# Patient Record
Sex: FEMALE | Race: WHITE | NOT HISPANIC OR LATINO | Employment: OTHER | URBAN - METROPOLITAN AREA
[De-identification: names, ages, dates, MRNs, and addresses within clinical notes are randomized per-mention and may not be internally consistent; named-entity substitution may affect disease eponyms.]

---

## 2018-09-08 ENCOUNTER — OUTSIDE FACILITY SERVICE (OUTPATIENT)
Dept: CARDIAC SURGERY | Facility: CLINIC | Age: 68
End: 2018-09-08

## 2018-09-08 PROCEDURE — 33533 CABG ARTERIAL SINGLE: CPT | Performed by: THORACIC SURGERY (CARDIOTHORACIC VASCULAR SURGERY)

## 2018-09-08 PROCEDURE — 33508 ENDOSCOPIC VEIN HARVEST: CPT | Performed by: THORACIC SURGERY (CARDIOTHORACIC VASCULAR SURGERY)

## 2018-09-08 PROCEDURE — 33519 CABG ARTERY-VEIN THREE: CPT | Performed by: THORACIC SURGERY (CARDIOTHORACIC VASCULAR SURGERY)

## 2018-09-28 ENCOUNTER — CLINICAL SUPPORT (OUTPATIENT)
Dept: CARDIAC SURGERY | Facility: CLINIC | Age: 68
End: 2018-09-28

## 2018-09-28 DIAGNOSIS — I25.110 ATHEROSCLEROSIS OF NATIVE CORONARY ARTERY WITH UNSTABLE ANGINA PECTORIS, UNSPECIFIED WHETHER NATIVE OR TRANSPLANTED HEART (HCC): ICD-10-CM

## 2018-09-28 DIAGNOSIS — E11.22 TYPE 2 DIABETES MELLITUS WITH DIABETIC CHRONIC KIDNEY DISEASE, UNSPECIFIED CKD STAGE, UNSPECIFIED WHETHER LONG TERM INSULIN USE (HCC): ICD-10-CM

## 2018-09-28 DIAGNOSIS — I12.9 HYPERTENSIVE CHRONIC KIDNEY DISEASE WITH STAGE 1 THROUGH STAGE 4 CHRONIC KIDNEY DISEASE, OR UNSPECIFIED CHRONIC KIDNEY DISEASE: ICD-10-CM

## 2018-09-28 DIAGNOSIS — Z48.812 ENCOUNTER FOR SURGICAL AFTERCARE FOLLOWING SURGERY OF CIRCULATORY SYSTEM: Primary | ICD-10-CM

## 2018-09-28 PROCEDURE — G0180 MD CERTIFICATION HHA PATIENT: HCPCS | Performed by: THORACIC SURGERY (CARDIOTHORACIC VASCULAR SURGERY)

## 2018-10-19 RX ORDER — GUAIFENESIN 600 MG/1
1200 TABLET, EXTENDED RELEASE ORAL 2 TIMES DAILY
COMMUNITY
End: 2018-10-22 | Stop reason: ALTCHOICE

## 2018-10-19 RX ORDER — NATEGLINIDE 120 MG/1
120 TABLET ORAL 2 TIMES DAILY
COMMUNITY

## 2018-10-22 ENCOUNTER — OFFICE VISIT (OUTPATIENT)
Dept: CARDIAC SURGERY | Facility: CLINIC | Age: 68
End: 2018-10-22

## 2018-10-22 VITALS
BODY MASS INDEX: 29.7 KG/M2 | HEART RATE: 90 BPM | OXYGEN SATURATION: 96 % | WEIGHT: 189.2 LBS | SYSTOLIC BLOOD PRESSURE: 134 MMHG | RESPIRATION RATE: 20 BRPM | HEIGHT: 67 IN | TEMPERATURE: 98 F | DIASTOLIC BLOOD PRESSURE: 82 MMHG

## 2018-10-22 DIAGNOSIS — Z95.1 S/P CABG X 4: Primary | ICD-10-CM

## 2018-10-22 PROCEDURE — 99024 POSTOP FOLLOW-UP VISIT: CPT | Performed by: NURSE PRACTITIONER

## 2018-10-22 RX ORDER — LISINOPRIL 2.5 MG/1
2.5 TABLET ORAL DAILY
COMMUNITY
End: 2020-02-09

## 2018-10-22 NOTE — PROGRESS NOTES
"CARDIOVASCULAR SURGERY FOLLOW-UP PROGRESS NOTE  Chief Complaint: Post-op Follow Up        HPI:   Dear Dr. Ritchie, Marky MEJÍA MD and colleagues:    It was nice to see Kathy Wu in follow up today after cardiac surgery.  As you know, she is a 68 y.o. female who presented to Dr. Ritchie's office and was direct admitted to [facility].  She was diagnosed with severe CAD, tobacco abuse, and DM who underwent CABG at [facility] by Dr. Palmer on 9/8/2018. She did well postoperatively but did require nocturnal oxygen at discharge.  She continues to do well. She comes in today complaining of nothing.  Her activity level has been good.   She has stopped smoking.  She has followed up with Dr. Hurtado and Dr. Ritchie. Her  is with her today.    Physical Exam:         /82 (BP Location: Right arm, Patient Position: Sitting, Cuff Size: Adult)   Pulse 90   Temp 98 °F (36.7 °C) (Oral)   Resp 20   Ht 168.9 cm (66.5\")   Wt 85.8 kg (189 lb 3.2 oz)   SpO2 96%   BMI 30.08 kg/m²   Heart:  regular rate and rhythm  Lungs:  clear to auscultation bilaterally  Extremities:  no edema  Incision(s):  mid chest healing well, right leg healing well    Assessment/Plan:     S/P CABG. Overall, she is doing well.  She will follow up with Dr. Ritchie about her nocturnal oxygen and the study to see if she still needs the oxygen.  She states she is not doing cardiac rehab because she is already so active.      No significant post-op complications.    Follow-up with CT surgery prn.    No restrictions of activity.      Thank you for allowing me to participate in the care of your patient.    Regards,  Maria Eugenia Rangel, APRN      "

## 2019-06-05 ENCOUNTER — CONVERSION ENCOUNTER (OUTPATIENT)
Dept: CARDIOLOGY | Facility: CLINIC | Age: 69
End: 2019-06-05

## 2019-06-05 VITALS
DIASTOLIC BLOOD PRESSURE: 79 MMHG | BODY MASS INDEX: 29.17 KG/M2 | SYSTOLIC BLOOD PRESSURE: 105 MMHG | HEART RATE: 97 BPM | WEIGHT: 183.5 LBS

## 2019-06-06 NOTE — PROGRESS NOTES
Visit Type:  Follow-up Visit  Primary Provider:  Marky Ritchie MD    CC:  6 mo f/u hypertension.    History of Present Illness:  69-year-old white female with history of coronary disease status post coronary artery bypass surgery hypertension hyperlipidemia diabetes and COPD presents to office for follow-up.  Patient is currently stable without any symptoms of chest pain or   shortness of breath at rest or exertion.  No complaints of any PND or orthopnea.  No palpitations dizziness syncope or swelling of the feet.  She was in the hospital complaints of chest pain and had a stress Myoview which was abnormal.  She had cardiac   catheterization which showed severe 3 vessel coronary disease.  She underwent coronary bypass surgery with a LIMA to LAD and saphenous graft to the 1st diagonal branch and to the lateral marginal branch of the circumflex artery into the PDA.  Patient is   taking medicines regularly and exercising regularly.  She does not smoke.      Vital Signs:    Patient Profile:    69 Years Old Female  Weight:     183.5 pounds  (Measured Weight:  183.5 lbs.  oz.)  Pulse rate: 97 / minute    BP sittin / 79  (left arm)  Cuff size:  regular   Vitals Entered By: Gertrude Johns (2019 10:00 AM)    Medications:  Medications were reviewed with the patient during this visit.    Allergies:   * CODEINE (Severe)    Allergies were reviewed with the patient during this visit.    Current Allergies (reviewed today):  * CODEINE (Severe)    Current Medications (including medications started today):   METFORMIN  MG ORAL TABLET (METFORMIN HCL) Take one (1) tablet by mouth twice a day  VITAMIN D2 TABLET (ERGOCALCIFEROL TABS) 50,000 IU  IPRATROPIUM-ALBUTEROL 0.5-2.5 (3) MG/3ML INHALATION SOLUTION (IPRATROPIUM-ALBUTEROL)   ASPIR-LOW 81 MG ORAL TABLET DELAYED RELEASE (ASPIRIN) Take 1 tablet by mouth daily  NATEGLINIDE 120 MG ORAL TABLET (NATEGLINIDE) Take 1 tablet by mouth daily  ALPRAZOLAM 1 MG ORAL TABLET  (ALPRAZOLAM) Take one (1) tablet by mouth four times a day  BUSPIRONE HCL 10 MG ORAL TABLET (BUSPIRONE HCL) Take one (1) tablet by mouth twice a day  METFORMIN  MG ORAL TABLET (METFORMIN HCL) Take one (1) tablet by mouth twice a day  LISINOPRIL 2.5 MG ORAL TABLET (LISINOPRIL) Take 1 tablet by mouth daily  ATORVASTATIN CALCIUM 40 MG ORAL TABLET (ATORVASTATIN CALCIUM) Take 1 tablet by mouth daily  FUROSEMIDE 20 MG ORAL TABLET (FUROSEMIDE) Take 1 tablet by mouth daily      Past Medical History:     Reviewed history from 10/01/2018 and no changes required:        Hypertension        Dyslipidemia        Type 2 diabetes         COPD    Past Surgical History:     Reviewed history from 10/01/2018 and no changes required:        Cholecystectomy        CABG x4 9/2018    Family History Summary:      Reviewed history Last on 02/07/2019 and no changes required:06/05/2019  Mother - Has Family History of Heart Disease - Entered On: 10/1/2018  Father - Has Family History of Heart Disease - Entered On: 10/1/2018      Social History:     Reviewed history and no changes required:        Risk Factors:     Smoked Tobacco Use:  Former smoker     Cigarettes:  Yes        Year quit:  2018        Years Since Last Quit:  1   Caffeine use:  0 drinks per day        Review of Systems     General       Denies fever, chills and fatigue.    Eyes       Denies double vision and blurring.    ENT       Denies earache and nosebleeds.    CV       Denies chest pain or discomfort, racing/skipping heart beats, lightheadedness, shortness of breath with exertion and swelling of hands or feet.    Resp       Denies cough.    GI       Denies nausea, vomiting, abdominal pain, diarrhea and constipation.    Derm       Denies rash.    Neuro       Denies headaches, numbness, tingling and fainting.    Psych       Denies anxiety and depression.    Heme       Denies bleeding and abnormal bruising.      Physical Exam    General:      well developed, well  nourished, in no acute distress.    Head:      normocephalic and atraumatic.    Eyes:      PERRL/EOM intact, conjunctiva and sclera clear with out nystagmus.    Neck:      no bruit and no JVD.    Lungs:      clear bilaterally to auscultation.    Heart:      non-displaced PMI, chest non-tender; regular rate and rhythm, S1, S2 without murmurs, rubs, or gallops  Abdomen:      non-tender.    Msk:      no deformity or scoliosis noted of thoracic or lumbar spine.    Pulses:      pulses normal in all 4 extremities.    Extremities:      no pedal edema.    Neurologic:        No focal deficits  Skin:      intact without lesions or rashes.    Psych:      alert and cooperative; normal mood and affect; normal attention span and concentration.      Diabetes Management Exam:      Foot Exam (with socks and/or shoes not present):        Pulses:           pulses normal in all 4 extremities.        Blood Pressure:  Today's BP: 105/79 mm Hg            Impression & Recommendations:    Problem # 1:  CORONARY ARTERY DISEASE (ICD-414.00) (MVN89-D95.10)    Patient had Coronary bypass surgery with a LIMA to LAD and saphenous graft to the diagonal branch, to the marginal branch and to the PDA of the right coronary.  Patient is currently stable on medications.  The following medications were removed from the medication list:     Metoprolol Tartrate 25 Mg Oral Tablet (Metoprolol tartrate) ..... Take 1 tablet by mouth every 12 hours    Her updated medication list for this problem includes:     Aspir-low 81 Mg Oral Tablet Delayed Release (Aspirin) ..... Take 1 tablet by mouth daily     Lisinopril 2.5 Mg Oral Tablet (Lisinopril) ..... Take 1 tablet by mouth daily     Furosemide 20 Mg Oral Tablet (Furosemide) ..... Take 1 tablet by mouth daily      Problem # 2:  Diabetes mellitus, type 2 (ICD-250.00) (AZB59-L35.9)    Patient's sugar levels are followed by primary care doc  Her updated medication list for this problem includes:     Metformin Hcl 500  Mg Oral Tablet (Metformin hcl) ..... Take one (1) tablet by mouth twice a day     Aspir-low 81 Mg Oral Tablet Delayed Release (Aspirin) ..... Take 1 tablet by mouth daily     Nateglinide 120 Mg Oral Tablet (Nateglinide) ..... Take 1 tablet by mouth daily     Metformin Hcl 500 Mg Oral Tablet (Metformin hcl) ..... Take one (1) tablet by mouth twice a day     Lisinopril 2.5 Mg Oral Tablet (Lisinopril) ..... Take 1 tablet by mouth daily      Problem # 3:  Dyslipidemia (ICD-272.4) (MQM64-E18.5)   patient's lipid levels are followed by the primary doctor  Her updated medication list for this problem includes:     Atorvastatin Calcium 40 Mg Oral Tablet (Atorvastatin calcium) ..... Take 1 tablet by mouth daily      Problem # 4:  Hypertension (ICD-401.9) (ZZD16-W70)   patient blood pressure is currently stable on medications  The following medications were removed from the medication list:     Metoprolol Tartrate 25 Mg Oral Tablet (Metoprolol tartrate) ..... Take 1 tablet by mouth every 12 hours    Her updated medication list for this problem includes:     Lisinopril 2.5 Mg Oral Tablet (Lisinopril) ..... Take 1 tablet by mouth daily     Furosemide 20 Mg Oral Tablet (Furosemide) ..... Take 1 tablet by mouth daily      Problem # 5:  COPD (IAL89-D89.9)   patient has COPD and does not smoke anymore.  Her updated medication list for this problem includes:     Ipratropium-albuterol 0.5-2.5 (3) Mg/3ml Inhalation Solution (Ipratropium-albuterol)      Medications Added to Medication List This Visit:  1)  Metformin Hcl 500 Mg Oral Tablet (Metformin hcl) .... Take one (1) tablet by mouth twice a day  2)  Vitamin D2 Tablet (Ergocalciferol tabs) .... 50,000 iu  3)  Lisinopril 2.5 Mg Oral Tablet (Lisinopril) .... Take 1 tablet by mouth daily                  Medication Administration    Orders Added:  1)  61839-Kwn Vst-Est Level IV [CPT-02236]    ]      Electronically signed by Clifford Huertas MD on 06/05/2019 at 10:51  AM  ________________________________________________________________________       Disclaimer: Converted Note message may not contain all data elements that existed in the legacy source system. Please see [facility] for the original note details.

## 2019-06-14 ENCOUNTER — OFFICE (OUTPATIENT)
Dept: URBAN - METROPOLITAN AREA CLINIC 64 | Facility: CLINIC | Age: 69
End: 2019-06-14

## 2019-06-14 VITALS
HEIGHT: 66 IN | DIASTOLIC BLOOD PRESSURE: 84 MMHG | SYSTOLIC BLOOD PRESSURE: 179 MMHG | WEIGHT: 183 LBS | HEART RATE: 80 BPM

## 2019-06-14 DIAGNOSIS — Z12.11 ENCOUNTER FOR SCREENING FOR MALIGNANT NEOPLASM OF COLON: ICD-10-CM

## 2019-06-14 DIAGNOSIS — K59.00 CONSTIPATION, UNSPECIFIED: ICD-10-CM

## 2019-06-14 PROCEDURE — 99202 OFFICE O/P NEW SF 15 MIN: CPT | Performed by: NURSE PRACTITIONER

## 2019-07-02 RX ORDER — MAGNESIUM CARB/ALUMINUM HYDROX 105-160MG
296 TABLET,CHEWABLE ORAL ONCE
Status: DISCONTINUED | OUTPATIENT
Start: 2019-07-02 | End: 2019-07-08 | Stop reason: HOSPADM

## 2019-07-08 ENCOUNTER — ANESTHESIA (OUTPATIENT)
Dept: GASTROENTEROLOGY | Facility: HOSPITAL | Age: 69
End: 2019-07-08

## 2019-07-08 ENCOUNTER — ANESTHESIA EVENT (OUTPATIENT)
Dept: GASTROENTEROLOGY | Facility: HOSPITAL | Age: 69
End: 2019-07-08

## 2019-07-08 ENCOUNTER — HOSPITAL ENCOUNTER (OUTPATIENT)
Facility: HOSPITAL | Age: 69
Setting detail: HOSPITAL OUTPATIENT SURGERY
Discharge: HOME | End: 2019-07-08
Attending: INTERNAL MEDICINE | Admitting: INTERNAL MEDICINE

## 2019-07-08 ENCOUNTER — ON CAMPUS - OUTPATIENT (OUTPATIENT)
Dept: URBAN - METROPOLITAN AREA HOSPITAL 85 | Facility: HOSPITAL | Age: 69
End: 2019-07-08
Payer: COMMERCIAL

## 2019-07-08 VITALS
HEIGHT: 66 IN | OXYGEN SATURATION: 99 % | RESPIRATION RATE: 14 BRPM | SYSTOLIC BLOOD PRESSURE: 148 MMHG | BODY MASS INDEX: 29.27 KG/M2 | TEMPERATURE: 97.2 F | HEART RATE: 79 BPM | WEIGHT: 182.1 LBS | DIASTOLIC BLOOD PRESSURE: 63 MMHG

## 2019-07-08 DIAGNOSIS — D12.0 BENIGN NEOPLASM OF CECUM: ICD-10-CM

## 2019-07-08 DIAGNOSIS — Z12.11 ENCOUNTER FOR SCREENING FOR MALIGNANT NEOPLASM OF COLON: ICD-10-CM

## 2019-07-08 DIAGNOSIS — K59.00 CONSTIPATION, UNSPECIFIED: ICD-10-CM

## 2019-07-08 DIAGNOSIS — R19.5 OTHER FECAL ABNORMALITIES: ICD-10-CM

## 2019-07-08 DIAGNOSIS — K64.4 RESIDUAL HEMORRHOIDAL SKIN TAGS: ICD-10-CM

## 2019-07-08 DIAGNOSIS — K64.8 OTHER HEMORRHOIDS: ICD-10-CM

## 2019-07-08 DIAGNOSIS — D12.2 BENIGN NEOPLASM OF ASCENDING COLON: ICD-10-CM

## 2019-07-08 DIAGNOSIS — K63.5 POLYP OF CECUM: ICD-10-CM

## 2019-07-08 DIAGNOSIS — K63.5 POLYP OF COLON: ICD-10-CM

## 2019-07-08 DIAGNOSIS — K57.30 DIVERTICULOSIS OF LARGE INTESTINE WITHOUT PERFORATION OR ABS: ICD-10-CM

## 2019-07-08 LAB
GLUCOSE BLDC GLUCOMTR-MCNC: 159 MG/DL (ref 70–105)
GLUCOSE BLDC GLUCOMTR-MCNC: 202 MG/DL (ref 70–105)

## 2019-07-08 PROCEDURE — 45385 COLONOSCOPY W/LESION REMOVAL: CPT | Performed by: INTERNAL MEDICINE

## 2019-07-08 PROCEDURE — 82962 GLUCOSE BLOOD TEST: CPT

## 2019-07-08 PROCEDURE — 88305 TISSUE EXAM BY PATHOLOGIST: CPT | Performed by: INTERNAL MEDICINE

## 2019-07-08 PROCEDURE — 25010000002 ONDANSETRON PER 1 MG: Performed by: ANESTHESIOLOGY

## 2019-07-08 PROCEDURE — 25010000002 PROPOFOL 10 MG/ML EMULSION: Performed by: ANESTHESIOLOGY

## 2019-07-08 RX ORDER — ONDANSETRON 2 MG/ML
4 INJECTION INTRAMUSCULAR; INTRAVENOUS ONCE AS NEEDED
Status: DISCONTINUED | OUTPATIENT
Start: 2019-07-08 | End: 2019-07-08 | Stop reason: HOSPADM

## 2019-07-08 RX ORDER — SODIUM CHLORIDE 9 MG/ML
9 INJECTION, SOLUTION INTRAVENOUS CONTINUOUS PRN
Status: DISCONTINUED | OUTPATIENT
Start: 2019-07-08 | End: 2019-07-08 | Stop reason: HOSPADM

## 2019-07-08 RX ORDER — PROMETHAZINE HYDROCHLORIDE 25 MG/1
25 TABLET ORAL ONCE AS NEEDED
Status: DISCONTINUED | OUTPATIENT
Start: 2019-07-08 | End: 2019-07-08 | Stop reason: HOSPADM

## 2019-07-08 RX ORDER — DEXAMETHASONE SODIUM PHOSPHATE 4 MG/ML
8 INJECTION, SOLUTION INTRA-ARTICULAR; INTRALESIONAL; INTRAMUSCULAR; INTRAVENOUS; SOFT TISSUE ONCE AS NEEDED
Status: DISCONTINUED | OUTPATIENT
Start: 2019-07-08 | End: 2019-07-08 | Stop reason: HOSPADM

## 2019-07-08 RX ORDER — PROMETHAZINE HYDROCHLORIDE 25 MG/ML
6.25 INJECTION, SOLUTION INTRAMUSCULAR; INTRAVENOUS ONCE AS NEEDED
Status: DISCONTINUED | OUTPATIENT
Start: 2019-07-08 | End: 2019-07-08 | Stop reason: HOSPADM

## 2019-07-08 RX ORDER — PROPOFOL 10 MG/ML
VIAL (ML) INTRAVENOUS AS NEEDED
Status: DISCONTINUED | OUTPATIENT
Start: 2019-07-08 | End: 2019-07-08 | Stop reason: SURG

## 2019-07-08 RX ORDER — PROMETHAZINE HYDROCHLORIDE 25 MG/1
25 SUPPOSITORY RECTAL ONCE AS NEEDED
Status: DISCONTINUED | OUTPATIENT
Start: 2019-07-08 | End: 2019-07-08 | Stop reason: HOSPADM

## 2019-07-08 RX ORDER — SODIUM CHLORIDE 0.9 % (FLUSH) 0.9 %
3-10 SYRINGE (ML) INJECTION AS NEEDED
Status: DISCONTINUED | OUTPATIENT
Start: 2019-07-08 | End: 2019-07-08 | Stop reason: HOSPADM

## 2019-07-08 RX ORDER — ONDANSETRON 2 MG/ML
4 INJECTION INTRAMUSCULAR; INTRAVENOUS ONCE AS NEEDED
Status: COMPLETED | OUTPATIENT
Start: 2019-07-08 | End: 2019-07-08

## 2019-07-08 RX ORDER — SODIUM CHLORIDE 0.9 % (FLUSH) 0.9 %
3 SYRINGE (ML) INJECTION EVERY 12 HOURS SCHEDULED
Status: DISCONTINUED | OUTPATIENT
Start: 2019-07-08 | End: 2019-07-08 | Stop reason: HOSPADM

## 2019-07-08 RX ORDER — MAGNESIUM CARB/ALUMINUM HYDROX 105-160MG
296 TABLET,CHEWABLE ORAL ONCE
Status: DISCONTINUED | OUTPATIENT
Start: 2019-07-08 | End: 2019-07-08 | Stop reason: HOSPADM

## 2019-07-08 RX ADMIN — ONDANSETRON 4 MG: 2 INJECTION INTRAMUSCULAR; INTRAVENOUS at 09:00

## 2019-07-08 RX ADMIN — PROPOFOL 300 MG: 10 INJECTION, EMULSION INTRAVENOUS at 09:06

## 2019-07-08 RX ADMIN — SODIUM CHLORIDE 9 ML/HR: 0.9 INJECTION, SOLUTION INTRAVENOUS at 08:49

## 2019-07-08 NOTE — DISCHARGE INSTRUCTIONS
A responsible adult should stay with you and you should rest quietly for the rest of the day.    Do not drink alcohol, drive, operate any heavy machinery or power tools or make any legal/important decisions for the next 24 hours.     Progress your diet as tolerated.    If you begin to experience severe pain, increased shortness of breath, racing heartbeat or a fever above 101 F, seek immediate medical attention.    Follow up with MD as instructed.

## 2019-07-08 NOTE — NURSING NOTE
Dr More informed that pt is on 325mg aspirin daily. He advised that pt can resume it today. Informed patient.    Pt blood sugar per meter 159. No new orders.

## 2019-07-08 NOTE — ANESTHESIA POSTPROCEDURE EVALUATION
Patient: Kathy Wu    Procedure Summary     Date:  07/08/19 Room / Location:  [facility] ENDOSCOPY 1 / [facility]COPY    Anesthesia Start:  0901 Anesthesia Stop:  0929    Procedure:  COLONOSCOPY with polypectomy X2 (N/A Rectum) Diagnosis:  (CONSTIPATION, SCREENING FOR MALIGNANT NEOPLASMS OF COLON)    Surgeon:  Jazz More MD Provider:  Steve Parra MD    Anesthesia Type:  MAC ASA Status:  3          Anesthesia Type: MAC  Last vitals  BP   148/63 (07/08/19 0950)   Temp   97.2 °F (36.2 °C) (07/08/19 0755)   Pulse   79 (07/08/19 0950)   Resp   14 (07/08/19 0952)     SpO2   99 % (07/08/19 0950)     Post Anesthesia Care and Evaluation    Patient location during evaluation: PACU  Patient participation: complete - patient participated  Level of consciousness: awake  Pain score: 0 (See nurse's notes for pain score)  Pain management: adequate  Airway patency: patent  Anesthetic complications: No anesthetic complications  PONV Status: none  Cardiovascular status: acceptable  Respiratory status: acceptable  Hydration status: acceptable    Comments: Patient seen and examined postoperatively; vital signs stable; SpO2 greater than or equal to 90%; cardiopulmonary status stable; nausea/vomiting adequately controlled; pain adequately controlled; no apparent anesthesia complications; patient discharged from anesthesia care when discharge criteria were met

## 2019-07-08 NOTE — OP NOTE
COLONOSCOPY Procedure Report    Patient Name:  Kathy Wu  YOB: 1950    Date of Surgery:  7/8/2019     Pre-Op Diagnosis:  CONSTIPATION, SCREENING FOR MALIGNANT NEOPLASMS OF COLON, patient had a positive Cologuard test as outpatient.       Post-Op Diagnosis Codes:  #1 polyp #2 diverticulosis #3 hemorrhoids      Procedure/CPT® Codes:      Procedure(s):  COLONOSCOPY with polypectomy X2    Staff:  Surgeon(s):  Jazz More MD      Anesthesia: Monitor Anesthesia Care    Description of Procedure:  Patient was brought to the endoscopy suite put in left lateral position after sedation with propofol under anesthesia,  scope regularization from rectum to cecum and terminal ileum.  Cecum ascending or proximal descending colon mucosa well-visualized.  Quality of prep was fair.  There was a one polyp removed from the cecum with a hot snare polypectomy technique.  This polyp measures on close to 8 mm.  There is another polyp removed from the ascending colon area close to an 8 mm with a cold snare polypectomy subsequently fulgurated with the tip of the hot snare.  Diverticulosis sigmoid descending colon was noticed.  She had extensive diverticulosis.  Moderate-sized internal/external hemorrhoid was seen.    Impression:  #1 polyps as detailed above  Diverticulosis  Hemorrhoids    Recommendations:  Follow with the biopsy result  Patient will be advised to take Benefiber twice a day.  Follow-up with the pathology report.  Repeat colonoscopy in 3 years.      Jazz More MD     Date: 7/8/2019    Time: 10:05 AM

## 2019-07-08 NOTE — INTERVAL H&P NOTE
H&P reviewed. The patient was examined and there are no changes to the H&P.   patient seen and examined.  Nurse traction findings verified.  Interval history was updated after reviewing the office note patient was seen on June 19, 2019 at the office.  She does have upper scope recently which was unremarkable now because of iron deficiency anemia, colonoscopy examination is being performed.  Clearance from the cardiology has been obtained.

## 2019-07-09 LAB
LAB AP CASE REPORT: NORMAL
PATH REPORT.FINAL DX SPEC: NORMAL
PATH REPORT.GROSS SPEC: NORMAL

## 2019-08-30 ENCOUNTER — TRANSCRIBE ORDERS (OUTPATIENT)
Dept: LAB | Facility: HOSPITAL | Age: 69
End: 2019-08-30

## 2019-08-30 ENCOUNTER — LAB (OUTPATIENT)
Dept: LAB | Facility: HOSPITAL | Age: 69
End: 2019-08-30

## 2019-08-30 DIAGNOSIS — E11.8 DIABETIC COMPLICATION (HCC): ICD-10-CM

## 2019-08-30 DIAGNOSIS — E55.9 VITAMIN D DEFICIENCY, UNSPECIFIED: ICD-10-CM

## 2019-08-30 DIAGNOSIS — N25.81 SECONDARY HYPERPARATHYROIDISM OF RENAL ORIGIN (HCC): ICD-10-CM

## 2019-08-30 DIAGNOSIS — I10 ESSENTIAL HYPERTENSION, MALIGNANT: ICD-10-CM

## 2019-08-30 DIAGNOSIS — N18.30 CHRONIC KIDNEY DISEASE, STAGE III (MODERATE) (HCC): Primary | ICD-10-CM

## 2019-08-30 DIAGNOSIS — R80.9 PROTEINURIA, UNSPECIFIED TYPE: ICD-10-CM

## 2019-08-30 DIAGNOSIS — E55.9 AVITAMINOSIS D: ICD-10-CM

## 2019-08-30 DIAGNOSIS — N18.30 CHRONIC KIDNEY DISEASE, STAGE III (MODERATE) (HCC): ICD-10-CM

## 2019-08-30 LAB
ANION GAP SERPL CALCULATED.3IONS-SCNC: 18.8 MMOL/L (ref 5–15)
BASOPHILS # BLD AUTO: 0 10*3/MM3 (ref 0–0.2)
BASOPHILS NFR BLD AUTO: 0.5 % (ref 0–1.5)
BUN BLD-MCNC: 26 MG/DL (ref 8–20)
BUN/CREAT SERPL: 20 (ref 5.4–26.2)
CALCIUM SPEC-SCNC: 9 MG/DL (ref 8.9–10.3)
CHLORIDE SERPL-SCNC: 101 MMOL/L (ref 101–111)
CO2 SERPL-SCNC: 23 MMOL/L (ref 22–32)
CREAT BLD-MCNC: 1.3 MG/DL (ref 0.4–1)
DEPRECATED RDW RBC AUTO: 46.8 FL (ref 37–54)
EOSINOPHIL # BLD AUTO: 0.2 10*3/MM3 (ref 0–0.4)
EOSINOPHIL NFR BLD AUTO: 2.7 % (ref 0.3–6.2)
ERYTHROCYTE [DISTWIDTH] IN BLOOD BY AUTOMATED COUNT: 14 % (ref 12.3–15.4)
GFR SERPL CREATININE-BSD FRML MDRD: 40 ML/MIN/1.73
GLUCOSE BLD-MCNC: 235 MG/DL (ref 65–99)
HBA1C MFR BLD: 6 % (ref 3.5–5.6)
HCT VFR BLD AUTO: 42.2 % (ref 34–46.6)
HGB BLD-MCNC: 13.9 G/DL (ref 12–15.9)
LYMPHOCYTES # BLD AUTO: 2.3 10*3/MM3 (ref 0.7–3.1)
LYMPHOCYTES NFR BLD AUTO: 36.8 % (ref 19.6–45.3)
MCH RBC QN AUTO: 31.7 PG (ref 26.6–33)
MCHC RBC AUTO-ENTMCNC: 33 G/DL (ref 31.5–35.7)
MCV RBC AUTO: 95.9 FL (ref 79–97)
MONOCYTES # BLD AUTO: 0.3 10*3/MM3 (ref 0.1–0.9)
MONOCYTES NFR BLD AUTO: 4.9 % (ref 5–12)
NEUTROPHILS # BLD AUTO: 3.5 10*3/MM3 (ref 1.7–7)
NEUTROPHILS NFR BLD AUTO: 55.1 % (ref 42.7–76)
NRBC BLD AUTO-RTO: 0 /100 WBC (ref 0–0.2)
PLATELET # BLD AUTO: 263 10*3/MM3 (ref 140–450)
PMV BLD AUTO: 9.6 FL (ref 6–12)
POTASSIUM BLD-SCNC: 4.8 MMOL/L (ref 3.6–5.1)
PTH-INTACT SERPL-MCNC: 51 PG/ML (ref 11–72)
RBC # BLD AUTO: 4.4 10*6/MM3 (ref 3.77–5.28)
SODIUM BLD-SCNC: 138 MMOL/L (ref 136–144)
URATE SERPL-MCNC: 5.7 MG/DL (ref 2.6–8)
WBC NRBC COR # BLD: 6.3 10*3/MM3 (ref 3.4–10.8)

## 2019-08-30 PROCEDURE — 80048 BASIC METABOLIC PNL TOTAL CA: CPT

## 2019-08-30 PROCEDURE — 84550 ASSAY OF BLOOD/URIC ACID: CPT

## 2019-08-30 PROCEDURE — 83970 ASSAY OF PARATHORMONE: CPT

## 2019-08-30 PROCEDURE — 83036 HEMOGLOBIN GLYCOSYLATED A1C: CPT

## 2019-08-30 PROCEDURE — 85025 COMPLETE CBC W/AUTO DIFF WBC: CPT

## 2019-08-30 PROCEDURE — 82306 VITAMIN D 25 HYDROXY: CPT

## 2019-08-30 PROCEDURE — 36415 COLL VENOUS BLD VENIPUNCTURE: CPT

## 2019-09-05 LAB — 25(OH)D3 SERPL-MCNC: 90.9 NG/ML (ref 30–100)

## 2019-10-07 ENCOUNTER — OFFICE (OUTPATIENT)
Dept: URBAN - METROPOLITAN AREA CLINIC 64 | Facility: CLINIC | Age: 69
End: 2019-10-07

## 2019-10-07 VITALS
DIASTOLIC BLOOD PRESSURE: 85 MMHG | WEIGHT: 191 LBS | HEART RATE: 82 BPM | HEIGHT: 66 IN | SYSTOLIC BLOOD PRESSURE: 153 MMHG

## 2019-10-07 DIAGNOSIS — E11.9 TYPE 2 DIABETES MELLITUS WITHOUT COMPLICATIONS: ICD-10-CM

## 2019-10-07 DIAGNOSIS — Z90.49 ACQUIRED ABSENCE OF OTHER SPECIFIED PARTS OF DIGESTIVE TRACT: ICD-10-CM

## 2019-10-07 DIAGNOSIS — I10 ESSENTIAL (PRIMARY) HYPERTENSION: ICD-10-CM

## 2019-10-07 DIAGNOSIS — Z86.010 PERSONAL HISTORY OF COLONIC POLYPS: ICD-10-CM

## 2019-10-07 PROCEDURE — 99212 OFFICE O/P EST SF 10 MIN: CPT | Performed by: INTERNAL MEDICINE

## 2020-02-09 ENCOUNTER — APPOINTMENT (OUTPATIENT)
Dept: GENERAL RADIOLOGY | Facility: HOSPITAL | Age: 70
End: 2020-02-09

## 2020-02-09 ENCOUNTER — HOSPITAL ENCOUNTER (OUTPATIENT)
Facility: HOSPITAL | Age: 70
Setting detail: OBSERVATION
Discharge: HOME | End: 2020-02-10
Attending: FAMILY MEDICINE | Admitting: FAMILY MEDICINE
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
## 2020-02-09 DIAGNOSIS — E87.5 HYPERKALEMIA: ICD-10-CM

## 2020-02-09 DIAGNOSIS — S42.292A OTHER CLOSED DISPLACED FRACTURE OF PROXIMAL END OF LEFT HUMERUS, INITIAL ENCOUNTER: ICD-10-CM

## 2020-02-09 LAB
ANION GAP SERPL CALCULATED.3IONS-SCNC: 11 MMOL/L (ref 5–15)
APTT PPP: 23.4 SECONDS (ref 24–31)
BASOPHILS # BLD AUTO: 0.1 10*3/MM3 (ref 0–0.2)
BASOPHILS NFR BLD AUTO: 0.9 % (ref 0–1.5)
BUN BLD-MCNC: 21 MG/DL (ref 8–23)
BUN/CREAT SERPL: 18.3 (ref 7–25)
CALCIUM SPEC-SCNC: 8.9 MG/DL (ref 8.6–10.5)
CHLORIDE SERPL-SCNC: 106 MMOL/L (ref 98–107)
CO2 SERPL-SCNC: 24 MMOL/L (ref 22–29)
CREAT BLD-MCNC: 1.15 MG/DL (ref 0.57–1)
DEPRECATED RDW RBC AUTO: 48.6 FL (ref 37–54)
EOSINOPHIL # BLD AUTO: 0.3 10*3/MM3 (ref 0–0.4)
EOSINOPHIL NFR BLD AUTO: 3.5 % (ref 0.3–6.2)
ERYTHROCYTE [DISTWIDTH] IN BLOOD BY AUTOMATED COUNT: 14.7 % (ref 12.3–15.4)
GFR SERPL CREATININE-BSD FRML MDRD: 46 ML/MIN/1.73
GLUCOSE BLD-MCNC: 221 MG/DL (ref 65–99)
GLUCOSE BLDC GLUCOMTR-MCNC: 174 MG/DL (ref 70–105)
GLUCOSE BLDC GLUCOMTR-MCNC: 179 MG/DL (ref 70–105)
HCT VFR BLD AUTO: 42.1 % (ref 34–46.6)
HGB BLD-MCNC: 14.3 G/DL (ref 12–15.9)
HOLD SPECIMEN: NORMAL
INR PPP: 0.92 (ref 0.9–1.1)
LYMPHOCYTES # BLD AUTO: 3.1 10*3/MM3 (ref 0.7–3.1)
LYMPHOCYTES NFR BLD AUTO: 37.8 % (ref 19.6–45.3)
MCH RBC QN AUTO: 31.8 PG (ref 26.6–33)
MCHC RBC AUTO-ENTMCNC: 33.9 G/DL (ref 31.5–35.7)
MCV RBC AUTO: 93.7 FL (ref 79–97)
MONOCYTES # BLD AUTO: 0.4 10*3/MM3 (ref 0.1–0.9)
MONOCYTES NFR BLD AUTO: 5.2 % (ref 5–12)
NEUTROPHILS # BLD AUTO: 4.3 10*3/MM3 (ref 1.7–7)
NEUTROPHILS NFR BLD AUTO: 52.6 % (ref 42.7–76)
NRBC BLD AUTO-RTO: 0.1 /100 WBC (ref 0–0.2)
PLATELET # BLD AUTO: 292 10*3/MM3 (ref 140–450)
PMV BLD AUTO: 9.8 FL (ref 6–12)
POTASSIUM BLD-SCNC: 5.2 MMOL/L (ref 3.5–5.2)
POTASSIUM BLD-SCNC: 6.2 MMOL/L (ref 3.5–5.2)
PROTHROMBIN TIME: 9.9 SECONDS (ref 9.6–11.7)
RBC # BLD AUTO: 4.5 10*6/MM3 (ref 3.77–5.28)
SODIUM BLD-SCNC: 141 MMOL/L (ref 136–145)
WBC NRBC COR # BLD: 8.2 10*3/MM3 (ref 3.4–10.8)

## 2020-02-09 PROCEDURE — 99284 EMERGENCY DEPT VISIT MOD MDM: CPT

## 2020-02-09 PROCEDURE — 71045 X-RAY EXAM CHEST 1 VIEW: CPT

## 2020-02-09 PROCEDURE — 96376 TX/PRO/DX INJ SAME DRUG ADON: CPT

## 2020-02-09 PROCEDURE — 96374 THER/PROPH/DIAG INJ IV PUSH: CPT

## 2020-02-09 PROCEDURE — 96375 TX/PRO/DX INJ NEW DRUG ADDON: CPT

## 2020-02-09 PROCEDURE — 85610 PROTHROMBIN TIME: CPT | Performed by: NURSE PRACTITIONER

## 2020-02-09 PROCEDURE — 85025 COMPLETE CBC W/AUTO DIFF WBC: CPT | Performed by: NURSE PRACTITIONER

## 2020-02-09 PROCEDURE — G0378 HOSPITAL OBSERVATION PER HR: HCPCS

## 2020-02-09 PROCEDURE — 94799 UNLISTED PULMONARY SVC/PX: CPT

## 2020-02-09 PROCEDURE — 25010000002 ONDANSETRON PER 1 MG

## 2020-02-09 PROCEDURE — 84132 ASSAY OF SERUM POTASSIUM: CPT | Performed by: INTERNAL MEDICINE

## 2020-02-09 PROCEDURE — 25010000002 CALCIUM GLUCONATE-NACL 1-0.675 GM/50ML-% SOLUTION: Performed by: NURSE PRACTITIONER

## 2020-02-09 PROCEDURE — 85730 THROMBOPLASTIN TIME PARTIAL: CPT | Performed by: NURSE PRACTITIONER

## 2020-02-09 PROCEDURE — 25010000002 MORPHINE PER 10 MG: Performed by: INTERNAL MEDICINE

## 2020-02-09 PROCEDURE — 63710000001 INSULIN REGULAR HUMAN PER 5 UNITS: Performed by: NURSE PRACTITIONER

## 2020-02-09 PROCEDURE — 25010000002 ONDANSETRON PER 1 MG: Performed by: EMERGENCY MEDICINE

## 2020-02-09 PROCEDURE — 82962 GLUCOSE BLOOD TEST: CPT

## 2020-02-09 PROCEDURE — 93005 ELECTROCARDIOGRAM TRACING: CPT | Performed by: NURSE PRACTITIONER

## 2020-02-09 PROCEDURE — 94640 AIRWAY INHALATION TREATMENT: CPT

## 2020-02-09 PROCEDURE — 25010000002 MORPHINE PER 10 MG: Performed by: EMERGENCY MEDICINE

## 2020-02-09 PROCEDURE — 73060 X-RAY EXAM OF HUMERUS: CPT

## 2020-02-09 PROCEDURE — 80048 BASIC METABOLIC PNL TOTAL CA: CPT | Performed by: NURSE PRACTITIONER

## 2020-02-09 PROCEDURE — 25010000002 MORPHINE PER 10 MG: Performed by: NURSE PRACTITIONER

## 2020-02-09 RX ORDER — ALUMINA, MAGNESIA, AND SIMETHICONE 2400; 2400; 240 MG/30ML; MG/30ML; MG/30ML
15 SUSPENSION ORAL EVERY 4 HOURS PRN
Status: DISCONTINUED | OUTPATIENT
Start: 2020-02-09 | End: 2020-02-10 | Stop reason: HOSPADM

## 2020-02-09 RX ORDER — ONDANSETRON 2 MG/ML
INJECTION INTRAMUSCULAR; INTRAVENOUS
Status: COMPLETED
Start: 2020-02-09 | End: 2020-02-09

## 2020-02-09 RX ORDER — IPRATROPIUM BROMIDE AND ALBUTEROL SULFATE 2.5; .5 MG/3ML; MG/3ML
3 SOLUTION RESPIRATORY (INHALATION)
COMMUNITY

## 2020-02-09 RX ORDER — ALUMINA, MAGNESIA, AND SIMETHICONE 2400; 2400; 240 MG/30ML; MG/30ML; MG/30ML
15 SUSPENSION ORAL ONCE
Status: COMPLETED | OUTPATIENT
Start: 2020-02-09 | End: 2020-02-09

## 2020-02-09 RX ORDER — MORPHINE SULFATE 4 MG/ML
4 INJECTION, SOLUTION INTRAMUSCULAR; INTRAVENOUS ONCE
Status: COMPLETED | OUTPATIENT
Start: 2020-02-09 | End: 2020-02-09

## 2020-02-09 RX ORDER — CALCIUM GLUCONATE 20 MG/ML
1 INJECTION, SOLUTION INTRAVENOUS ONCE
Status: COMPLETED | OUTPATIENT
Start: 2020-02-09 | End: 2020-02-09

## 2020-02-09 RX ORDER — IPRATROPIUM BROMIDE AND ALBUTEROL SULFATE 2.5; .5 MG/3ML; MG/3ML
3 SOLUTION RESPIRATORY (INHALATION)
Status: DISCONTINUED | OUTPATIENT
Start: 2020-02-09 | End: 2020-02-10 | Stop reason: HOSPADM

## 2020-02-09 RX ORDER — MORPHINE SULFATE 4 MG/ML
4 INJECTION, SOLUTION INTRAMUSCULAR; INTRAVENOUS EVERY 4 HOURS PRN
Status: DISCONTINUED | OUTPATIENT
Start: 2020-02-09 | End: 2020-02-10 | Stop reason: HOSPADM

## 2020-02-09 RX ORDER — SODIUM CHLORIDE 0.9 % (FLUSH) 0.9 %
10 SYRINGE (ML) INJECTION AS NEEDED
Status: DISCONTINUED | OUTPATIENT
Start: 2020-02-09 | End: 2020-02-10 | Stop reason: HOSPADM

## 2020-02-09 RX ORDER — ONDANSETRON 2 MG/ML
4 INJECTION INTRAMUSCULAR; INTRAVENOUS EVERY 6 HOURS PRN
Status: DISCONTINUED | OUTPATIENT
Start: 2020-02-09 | End: 2020-02-10 | Stop reason: HOSPADM

## 2020-02-09 RX ORDER — DOCUSATE SODIUM 100 MG/1
100 CAPSULE, LIQUID FILLED ORAL 2 TIMES DAILY
Status: DISCONTINUED | OUTPATIENT
Start: 2020-02-09 | End: 2020-02-10 | Stop reason: HOSPADM

## 2020-02-09 RX ORDER — NATEGLINIDE 120 MG/1
120 TABLET ORAL 2 TIMES DAILY WITH MEALS
Status: DISCONTINUED | OUTPATIENT
Start: 2020-02-09 | End: 2020-02-10 | Stop reason: HOSPADM

## 2020-02-09 RX ORDER — PANTOPRAZOLE SODIUM 40 MG/1
40 TABLET, DELAYED RELEASE ORAL
Status: DISCONTINUED | OUTPATIENT
Start: 2020-02-10 | End: 2020-02-10 | Stop reason: HOSPADM

## 2020-02-09 RX ORDER — DEXTROSE MONOHYDRATE 25 G/50ML
50 INJECTION, SOLUTION INTRAVENOUS ONCE
Status: COMPLETED | OUTPATIENT
Start: 2020-02-09 | End: 2020-02-09

## 2020-02-09 RX ORDER — BUSPIRONE HYDROCHLORIDE 10 MG/1
10 TABLET ORAL 2 TIMES DAILY
Status: DISCONTINUED | OUTPATIENT
Start: 2020-02-09 | End: 2020-02-10 | Stop reason: HOSPADM

## 2020-02-09 RX ORDER — CALCIUM CARBONATE 200(500)MG
1 TABLET,CHEWABLE ORAL 3 TIMES DAILY PRN
COMMUNITY

## 2020-02-09 RX ORDER — ONDANSETRON 2 MG/ML
4 INJECTION INTRAMUSCULAR; INTRAVENOUS ONCE
Status: COMPLETED | OUTPATIENT
Start: 2020-02-09 | End: 2020-02-09

## 2020-02-09 RX ORDER — LIDOCAINE HYDROCHLORIDE 20 MG/ML
15 SOLUTION OROPHARYNGEAL ONCE
Status: COMPLETED | OUTPATIENT
Start: 2020-02-09 | End: 2020-02-09

## 2020-02-09 RX ORDER — ATORVASTATIN CALCIUM 40 MG/1
40 TABLET, FILM COATED ORAL DAILY
Status: DISCONTINUED | OUTPATIENT
Start: 2020-02-09 | End: 2020-02-10 | Stop reason: HOSPADM

## 2020-02-09 RX ORDER — ALPRAZOLAM 1 MG/1
1 TABLET ORAL 4 TIMES DAILY PRN
Status: DISCONTINUED | OUTPATIENT
Start: 2020-02-09 | End: 2020-02-10 | Stop reason: HOSPADM

## 2020-02-09 RX ORDER — SODIUM CHLORIDE 0.9 % (FLUSH) 0.9 %
10 SYRINGE (ML) INJECTION EVERY 12 HOURS SCHEDULED
Status: DISCONTINUED | OUTPATIENT
Start: 2020-02-09 | End: 2020-02-10 | Stop reason: HOSPADM

## 2020-02-09 RX ORDER — CALCIUM CARBONATE 200(500)MG
1 TABLET,CHEWABLE ORAL 3 TIMES DAILY PRN
Status: DISCONTINUED | OUTPATIENT
Start: 2020-02-09 | End: 2020-02-10 | Stop reason: HOSPADM

## 2020-02-09 RX ORDER — SODIUM POLYSTYRENE SULFONATE 15 G/60ML
15 SUSPENSION ORAL; RECTAL ONCE
Status: COMPLETED | OUTPATIENT
Start: 2020-02-09 | End: 2020-02-09

## 2020-02-09 RX ORDER — SENNA PLUS 8.6 MG/1
1 TABLET ORAL 2 TIMES DAILY
Status: DISCONTINUED | OUTPATIENT
Start: 2020-02-09 | End: 2020-02-10 | Stop reason: HOSPADM

## 2020-02-09 RX ADMIN — SODIUM POLYSTYRENE SULFONATE 15 G: 15 SUSPENSION ORAL; RECTAL at 12:06

## 2020-02-09 RX ADMIN — DOCUSATE SODIUM 100 MG: 100 CAPSULE, LIQUID FILLED ORAL at 20:54

## 2020-02-09 RX ADMIN — ONDANSETRON 4 MG: 2 INJECTION INTRAMUSCULAR; INTRAVENOUS at 10:19

## 2020-02-09 RX ADMIN — Medication 10 ML: at 21:38

## 2020-02-09 RX ADMIN — ALPRAZOLAM 1 MG: 1 TABLET ORAL at 18:49

## 2020-02-09 RX ADMIN — Medication 10 ML: at 11:11

## 2020-02-09 RX ADMIN — IPRATROPIUM BROMIDE AND ALBUTEROL SULFATE 3 ML: .5; 3 SOLUTION RESPIRATORY (INHALATION) at 15:12

## 2020-02-09 RX ADMIN — IPRATROPIUM BROMIDE AND ALBUTEROL SULFATE 3 ML: .5; 3 SOLUTION RESPIRATORY (INHALATION) at 20:28

## 2020-02-09 RX ADMIN — CALCIUM GLUCONATE 1 G: 20 INJECTION, SOLUTION INTRAVENOUS at 12:07

## 2020-02-09 RX ADMIN — ALUMINUM HYDROXIDE, MAGNESIUM HYDROXIDE, AND DIMETHICONE 15 ML: 400; 400; 40 SUSPENSION ORAL at 11:14

## 2020-02-09 RX ADMIN — DEXTROSE 50 % IN WATER (D50W) INTRAVENOUS SYRINGE 50 ML: at 12:07

## 2020-02-09 RX ADMIN — INSULIN HUMAN 10 UNITS: 100 INJECTION, SOLUTION PARENTERAL at 12:07

## 2020-02-09 RX ADMIN — MORPHINE SULFATE 4 MG: 4 INJECTION INTRAVENOUS at 11:11

## 2020-02-09 RX ADMIN — MORPHINE SULFATE 4 MG: 4 INJECTION INTRAVENOUS at 21:38

## 2020-02-09 RX ADMIN — Medication 10 ML: at 10:19

## 2020-02-09 RX ADMIN — MORPHINE SULFATE 4 MG: 4 INJECTION INTRAVENOUS at 09:29

## 2020-02-09 RX ADMIN — LIDOCAINE HYDROCHLORIDE 15 ML: 20 SOLUTION ORAL; TOPICAL at 11:16

## 2020-02-09 RX ADMIN — ONDANSETRON 4 MG: 2 INJECTION INTRAMUSCULAR; INTRAVENOUS at 09:29

## 2020-02-09 RX ADMIN — BUSPIRONE HYDROCHLORIDE 10 MG: 10 TABLET ORAL at 20:53

## 2020-02-09 RX ADMIN — SODIUM CHLORIDE 500 ML: 900 INJECTION, SOLUTION INTRAVENOUS at 12:06

## 2020-02-09 NOTE — PROGRESS NOTES
Discharge Planning Assessment   Flash     Patient Name: Kathy Wu  MRN: [id number]  Today's Date: 2/9/2020    Admit Date: 2/9/2020    Discharge Needs Assessment     Row Name 02/09/20 0438       Discharge Needs Assessment    Equipment Needed After Discharge  orthotic device Spoke with Chano at [facility] he does not have Brace available tonight but will have it delivered early in AM. Msg to Dr Uribe by secure message and Voicemail.        Discharge Plan     Row Name 02/09/20 1649       Plan    Plan Comments  Referral sent to [facility] for sariento brace per ortho. Order verified. Notified Jill [phone]. Will deliver brace to room.        Destination      Coordination has not been started for this encounter.      Durable Medical Equipment      Service Provider Request Status Selected Services Address Phone Number Fax Number    [facility] [place] Pending - No Request Sent N/A [address], [facility] [address] [phone]       Yeny Anne RN 2/9/2020 1555    Sariento brace                     Klaudia Andrews RN

## 2020-02-09 NOTE — DISCHARGE PLACEMENT REQUEST
"Kathy Wu (70 y.o. Female)     Date of Birth Social Security Number Address Home Phone MRN    1950  [address]  [place] [facility] [phone] [id number]    [employer] Mar[employer] Status          [employer]        Admission Date Admission Type Admitting Provider Attending Provider Department, Room/Bed    2/9/20 Emergency Marky Ritchie MD Heimer, Brian T, MD [facility] SURGICAL INPATIENT, 4119/1    Discharge Date Discharge Disposition Discharge Destination                       Attending Provider:  Marky Ritchie MD    Allergies:  Codeine    Isolation:  None   Infection:  None   Code Status:  CPR    Ht:  168.9 cm (66.5\")   Wt:  88.1 kg (194 lb 3.6 oz)    Admission Cmt:  None   Principal Problem:  None                Active Insurance as of 2/9/2020     Primary Coverage     Payor Plan Insurance Group Employer/Plan Group    ANTHEM MEDICARE REPLACEMENT ANTHEM MEDICARE ADVANTAGE [id number]     Payor Plan Address Payor Plan Phone Number Payor Plan Fax Number Effective Dates    [address] [phone]  1/1/2018 - None Entered    [facility] [id number]       Subscriber Name Subscriber Birth Date Member ID       KATHY WU 1950 [id number]                 Emergency Contacts      (Rel.) Home Phone Work Phone Mobile Phone    Benji Wu (Spouse) [phone] -- [phone]               History & Physical      Nadine Smith MD at 02/09/20 1358              Patient Care Team:  Marky Ritchie MD as PCP - General (Family Medicine)  Ernst Hurtado MD as Consulting Physician (Cardiology)    Chief complaint arm pain    Subjective     Patient is a 70 y.o. female presents with a fall at home.  She was in the bathroom and bent over to scratch her ankle, lost her balance and hit her left shoulder on the bathtub.  She was found to have moderately displaced, comminuted left proximal humerus fracture.  She denies any chest pain, dizziness or near syncope leading to her " fall.  She did not hit her head.  She has been in her usual state of health prior to falling this morning.  She has been admitted for pain control and orthopedic evaluation.    Review of Systems   Constitutional: Positive for activity change. Negative for appetite change, diaphoresis, fatigue and fever.   HENT: Negative for mouth sores.    Eyes: Negative for visual disturbance.   Respiratory: Negative for cough, choking, chest tightness, shortness of breath, wheezing and stridor.    Cardiovascular: Negative for chest pain, palpitations and leg swelling.   Gastrointestinal: Negative for blood in stool, constipation, diarrhea, nausea and vomiting.   Endocrine: Negative for polyuria.   Genitourinary: Negative for frequency and urgency.   Musculoskeletal: Positive for arthralgias and joint swelling. Negative for back pain, gait problem, myalgias, neck pain and neck stiffness.   Skin: Negative for rash and wound.   Neurological: Negative for seizures and headaches.   Hematological: Negative for adenopathy.   Psychiatric/Behavioral: Negative for confusion.          History  Past Medical History:   Diagnosis Date   • 3-vessel CAD 09/06/2018    Severe on Cardiac Cath; Daily Aspirin--S/p CABG   • Abnormal EKG 09/04/2018   • Anxiety     Xanax   • Breast pain, left 09/06/2018    Per Pt   • Cataract    • Chest pain 09/2018   • Cholecystitis 07/2007    S/p Cholecystectomy   • Cholecystolithiasis 07/2007    S/p Cholecystectomy   • Colitis 2007   • COPD (chronic obstructive pulmonary disease) (CMS/[facility])    • Diverticulosis 2007   • DM (diabetes mellitus) (CMS/[facility]) Since 1950    T2--Metformin Currently   • Dyslipidemia     Controlled w/Meds   • Family history of heart disease     Mother & Father Both Had MI's   • Gastritis 2007   • History of bone density study 07/31/2008-[facility]    NL   • Hx of chest x-ray 09/4/18-[facility]    WNL w/Calcified Atherosclerotic Disease in Thoracic Aorta   • Hx of CT scan of chest 09/4/18-[facility]     Normal Findings with Degenerative Changes of Spine; No Evidence of Pulmonary Embolous Seen; 2.1CM Thyroid Nodule Noted   • Hypertension Since 1950    Controlled w/Meds   • Kidney disease, chronic, stage III (GFR 30-59 ml/min) (CMS/[facility])    • Nasal congestion 2016    Treated @ [facility] w/Antibiotics   • Postmenopausal    • Renal disease    • Ringing of ears, bilateral    • SOB (shortness of breath)     Chronic   • Stress    • Tobacco use     1 PPD     Past Surgical History:   Procedure Laterality Date   • BREAST SURGERY Right     biopsy   • CARDIAC CATHETERIZATION  18-[facility]    Dr. Hurtado--Severe L Main 3-V CAD   • CARDIAC SURGERY     • CHOLECYSTECTOMY  2007    Lap--Dusty Hernandez MD   • COLONOSCOPY     • COLONOSCOPY N/A 2019    Procedure: COLONOSCOPY with polypectomy X2;  Surgeon: Jazz More MD;  Location: [facility] ENDOSCOPY;  Service: Gastroenterology   • CORONARY ARTERY BYPASS GRAFT  18-[facility]    Urgent x 4 with Left Saphenous Vein Grafting-Dr. Palmer   • ENDOSCOPIC VEIN HARVEST  18-[facility]    Of R Lower Extremity-Dr. Palmer   • ENDOSCOPY  07-[facility]    w/EGD-Erich Wasserman MD   • HAND SURGERY     • LAPAROSCOPIC TUBAL LIGATION       Family History   Problem Relation Age of Onset   • Heart attack Mother    • Coronary artery disease Mother         Had CABG   • Heart attack Father          Age 63     Social History     Tobacco Use   • Smoking status: Former Smoker     Packs/day: 1.00     Types: Cigarettes     Last attempt to quit: 2018     Years since quittin.4   • Smokeless tobacco: Never Used   Substance Use Topics   • Alcohol use: No   • Drug use: No     Medications Prior to Admission   Medication Sig Dispense Refill Last Dose   • ALPRAZolam (XANAX) 1 MG tablet Take 1 mg by mouth 4 (Four) Times a Day.   2020 at Unknown time   • aspirin 81 MG tablet Take 325 mg by mouth Daily.   2020 at Unknown time   • atorvastatin (LIPITOR) 40 MG tablet Take 40 mg by mouth  Daily.   2/8/2020 at Unknown time   • busPIRone (BUSPAR) 10 MG tablet Take 10 mg by mouth 2 (Two) Times a Day.   2/8/2020 at Unknown time   • calcium carbonate (TUMS) 500 MG chewable tablet Chew 1 tablet 3 (Three) Times a Day As Needed for Indigestion or Heartburn.   2/8/2020 at Unknown time   • furosemide (LASIX) 20 MG tablet Take 20 mg by mouth 2 (Two) Times a Week. Monday and Friday 2/8/2020 at Unknown time   • ipratropium-albuterol (DUO-NEB) 0.5-2.5 mg/3 ml nebulizer Take 3 mL by nebulization 4 (Four) Times a Day.   2/8/2020 at Unknown time   • metFORMIN (GLUCOPHAGE) 500 MG tablet Take 500 mg by mouth 2 (Two) Times a Day With Meals.   2/8/2020 at Unknown time   • nateglinide (STARLIX) 120 MG tablet Take 120 mg by mouth 2 (Two) Times a Day.   2/8/2020 at Unknown time     Allergies:  Codeine    Objective     Vital Signs  Temp:  [97.5 °F (36.4 °C)-98.1 °F (36.7 °C)] 98.1 °F (36.7 °C)  Heart Rate:  [71-97] 80  Resp:  [16] 16  BP: (110-177)/() 157/76     Physical Exam:      General Appearance:    Alert, cooperative, in no acute distress   Head:    Normocephalic, without obvious abnormality, atraumatic   Eyes:            Lids and lashes normal, conjunctivae and sclerae normal, no   icterus, no pallor, corneas clear, PERRLA   Ears:    Ears appear intact with no abnormalities noted   Throat:   No oral lesions, no thrush, oral mucosa moist   Neck:   No adenopathy, supple, trachea midline, no thyromegaly, no   carotid bruit, no JVD   Lungs:     Clear to auscultation,respirations regular, even and                  unlabored    Heart:    Regular rhythm and normal rate, normal S1 and S2, no            murmur, no gallop, no rub, no click   Chest Wall:    No abnormalities observed   Abdomen:     Normal bowel sounds, no masses, no organomegaly, soft        non-tender, non-distended, no guarding, no rebound                tenderness   Extremities:  Left arm is immobilized in a splint; it is neurovascularly intact  distally   Pulses:   Pulses palpable and equal bilaterally   Skin:   No bleeding, bruising or rash   Lymph nodes:   No palpable adenopathy   Neurologic:   Cranial nerves 2 - 12 grossly intact, sensation intact, DTR       present and equal bilaterally       Results Review:     Imaging Results (Last 24 Hours)     Procedure Component Value Units Date/Time    XR Chest 1 View [[id number]] Collected:  02/09/20 1005     Updated:  02/09/20 1008    Narrative:       XR CHEST 1 VW-     Date of Exam: 2/9/2020 9:50 AM     Indication: pre op; W19.XXXA-Unspecified fall, initial encounter.     Comparison:  12/11/2018     Technique: Single view of the chest was obtained.     FINDINGS: The patient has undergone previous coronary bypass graft  surgery. The heart and pulmonary vessels are within normal limits. No  consolidative process or congestive change is noted. The mediastinum is  within normal limits. The bones show no focal abnormalities.       Impression:          1. Chronic postoperative changes noted about the chest. There is no  obvious pneumonia or congestive change        Electronically Signed ByBahman Gonzalez On:2/9/2020 10:06 AM  This report was finalized on [id number] by  Jeremiah Gonzalez .    XR Humerus Left [[id number]] Collected:  02/09/20 1005     Updated:  02/09/20 1007    Narrative:       DATE OF EXAM:  2/9/2020 9:50 AM     PROCEDURE:  XR HUMERUS LEFT-     INDICATIONS:  fall     COMPARISON:  No Comparisons Available     TECHNIQUE:   AP and lateral radiographic views were obtained of the left humerus.        FINDINGS:  There is a comminuted fracture involving the proximal third of the  humerus with moderate distraction and angulation of the fracture  fragments.        Impression:       Comminuted fracture of the proximal third of the humerus is noted as  described above     Electronically Signed ByBahman Gonzalez On:2/9/2020 10:05 AM  This report was finalized on [id number] by  Jeremiah Gonzalez .            Lab Results (last 24 hours)     Procedure Component Value Units Date/Time    Extra Tubes [[id number]] Collected:  02/09/20 1057    Specimen:  Blood from Arm, Right Updated:  02/09/20 1201    Narrative:       The following orders were created for panel order Extra Tubes.  Procedure                               Abnormality         Status                     ---------                               -----------         ------                     Gold Top - SST[[id number]]                                   Final result                 Please view results for these tests on the individual orders.    Gold Top - SST [[id number]] Collected:  02/09/20 1057    Specimen:  Blood from Arm, Right Updated:  02/09/20 1201     Extra Tube Hold for add-ons.     Comment: Auto resulted.       Basic Metabolic Panel [[id number]]  (Abnormal) Collected:  02/09/20 1053    Specimen:  Blood from Arm, Right Updated:  02/09/20 1127     Glucose 221 mg/dL      BUN 21 mg/dL      Creatinine 1.15 mg/dL      Sodium 141 mmol/L      Potassium 6.2 mmol/L      Chloride 106 mmol/L      CO2 24.0 mmol/L      Calcium 8.9 mg/dL      eGFR Non African Amer 46 mL/min/1.73      BUN/Creatinine Ratio 18.3     Anion Gap 11.0 mmol/L     Narrative:       GFR Normal >60  Chronic Kidney Disease <60  Kidney Failure <15      Protime-INR [[id number]]  (Normal) Collected:  02/09/20 1015    Specimen:  Blood Updated:  02/09/20 1028     Protime 9.9 Seconds      INR 0.92    aPTT [[id number]]  (Abnormal) Collected:  02/09/20 1015    Specimen:  Blood Updated:  02/09/20 1028     PTT 23.4 seconds     CBC & Differential [[id number]] Collected:  02/09/20 1015    Specimen:  Blood Updated:  02/09/20 1021    Narrative:       The following orders were created for panel order CBC & Differential.  Procedure                               Abnormality         Status                     ---------                               -----------         ------                     CBC Auto  Differential[[id number]]        Normal              Final result                 Please view results for these tests on the individual orders.    CBC Auto Differential [[id number]]  (Normal) Collected:  20 1015    Specimen:  Blood Updated:  20 1021     WBC 8.20 10*3/mm3      RBC 4.50 10*6/mm3      Hemoglobin 14.3 g/dL      Hematocrit 42.1 %      MCV 93.7 fL      MCH 31.8 pg      MCHC 33.9 g/dL      RDW 14.7 %      RDW-SD 48.6 fl      MPV 9.8 fL      Platelets 292 10*3/mm3      Neutrophil % 52.6 %      Lymphocyte % 37.8 %      Monocyte % 5.2 %      Eosinophil % 3.5 %      Basophil % 0.9 %      Neutrophils, Absolute 4.30 10*3/mm3      Lymphocytes, Absolute 3.10 10*3/mm3      Monocytes, Absolute 0.40 10*3/mm3      Eosinophils, Absolute 0.30 10*3/mm3      Basophils, Absolute 0.10 10*3/mm3      nRBC 0.1 /100 WBC            I reviewed the patient's new clinical results.    Assessment/Plan       Fall  Left comminuted displaced proximal humerus fracture  Hyperkalemia  Chronic coronary artery disease  Chronic kidney disease stage III  Panlobular emphysema  Nicotine abuse  Generalized anxiety disorder  Essential hypertension  Next hyperlipidemia    Pain is currently controlled with splinting and morphine.  Elevated potassium was treated in the ER and will be rechecked now.  She is medically stable for surgical intervention.  Home medications for her other chronic problems will be continued.    I discussed the patients findings and my recommendations with patient.     Nadine Smith MD  20  1:58 PM        Electronically signed by Nadine Smith MD at 20 1401       [facility] SURGICAL INPATIENT  [address] [facility] [place] [address]  Dept. Phone:  [phone]  Dept. Fax:   Date Ordered: 2020         Patient:  Kathy Wu MRN:  [id number]   [address] [place] [address] :  1950  SSN:    Phone: [phone] Sex:  F     Weight: 88.1 kg (194 lb 3.6 oz)         Ht  "Readings from Last 1 Encounters:   02/09/20 168.9 cm (66.5\")         Miscellaneous DME   (Order ID: [id number])    Diagnosis:  Other closed displaced fracture of proximal end of left humerus, initial encounter (S42.292A [ICD-10-CM] 812.09 [ICD-9-CM])   Quantity:  1     Type of DME: mcguire brace  Length of Need (99 Months = Lifetime): 99 Months = Lifetime        Authorizing Provider's Phone: [phone]   Verbal Order Mode: Verbal with readback   Authorizing Provider: Kennedy Uribe MD  Authorizing Provider's NPI: [id number]     Order Entered By: Yeny Anne RN 2/9/2020  3:52 PM     Electronically signed by:            "

## 2020-02-09 NOTE — PROGRESS NOTES
Discharge Planning Assessment   Flash     Patient Name: Kathy Wu  MRN: [id number]  Today's Date: 2/9/2020    Admit Date: 2/9/2020      Discharge Plan     Row Name 02/09/20 1641       Plan    Plan Comments  Referral sent to [facility] for sariento brace per ortho. Order verified. Notified Jill [phone]. Will deliver brace to room.           Durable Medical Equipment      Service Provider Request Status Selected Services Address Phone Number Fax Number    [facility] [place] Pending - No Request Sent N/A [address] [id number] [phone]       Yeny Anne RN 2/9/2020 1555    Erika Anne RN

## 2020-02-09 NOTE — ED PROVIDER NOTES
Subjective   History of Present Illness  Patient is a 70-year-old female presents with left upper arm pain after she fell in the bathroom just prior to arrival to the ED.  Patient states that she was bending down to scratch her ankle when she lost her balance and fell backwards into the bathtub.  She denies hitting her head or LOC at the time of the incident denies any current headache nausea or vomiting.  Describes her pain as a sharp shooting pain that radiates down to her wrist at times.  Currently rates it as severe denies any paresthesias numbness or weakness.  Does report some localized swelling of her upper left arm.  She denies any chest pain, shortness of breath, back or neck pain.  Review of Systems   Constitutional: Negative.    HENT: Negative for nosebleeds.    Respiratory: Negative.    Cardiovascular: Negative.    Gastrointestinal: Negative for nausea and vomiting.   Musculoskeletal: Positive for arthralgias, back pain and myalgias. Negative for neck pain and neck stiffness.   Skin: Negative.    Neurological: Negative.        Past Medical History:   Diagnosis Date   • 3-vessel CAD 09/06/2018    Severe on Cardiac Cath; Daily Aspirin--S/p CABG   • Abnormal EKG 09/04/2018   • Anxiety     Xanax   • Breast pain, left 09/06/2018    Per Pt   • Cataract    • Chest pain 09/2018   • Cholecystitis 07/2007    S/p Cholecystectomy   • Cholecystolithiasis 07/2007    S/p Cholecystectomy   • Colitis 2007   • COPD (chronic obstructive pulmonary disease) (CMS/[facility])    • Diverticulosis 2007   • DM (diabetes mellitus) (CMS/[facility]) Since 1948    T2--Metformin Currently   • Dyslipidemia     Controlled w/Meds   • Family history of heart disease     Mother & Father Both Had MI's   • Gastritis 2007   • History of bone density study 07/31/2008-[facility]    NL   • Hx of chest x-ray 09/4/18-[facility]    WNL w/Calcified Atherosclerotic Disease in Thoracic Aorta   • Hx of CT scan of chest 09/4/18-[facility]    Normal Findings with  Degenerative Changes of Spine; No Evidence of Pulmonary Embolous Seen; 2.1CM Thyroid Nodule Noted   • Hypertension Since 1948    Controlled w/Meds   • Kidney disease, chronic, stage III (GFR 30-59 ml/min) (CMS/[facility])    • Nasal congestion 2016    Treated @ [facility] w/Antibiotics   • Postmenopausal    • Renal disease    • Ringing of ears, bilateral    • SOB (shortness of breath)     Chronic   • Stress    • Tobacco use     1 PPD       Allergies   Allergen Reactions   • Codeine Unknown (See Comments)     Unknown       Past Surgical History:   Procedure Laterality Date   • BREAST SURGERY Right     biopsy   • [facility] Hurtado--Severe L Main 3-V CAD   • CARDIAC SURGERY     • CHOLECYSTECTOMY  2007    Lap--Dusty Hernandez MD   • COLONOSCOPY     • COLONOSCOPY N/A 2019    Procedure: COLONOSCOPY with polypectomy X2;  Surgeon: Jazz More MD;  Location: [facility] ENDOSCOPY;  Service: Gastroenterology   • CORONARY ARTERY [facility] GRAFT  [facility]    Urgent x 4 with Left Saphenous Vein Grafting-Dr. Palmer   • ENDOSCOPIC VEIN HARVEST  [facility]    Of R Lower Extremity-Dr. Palmer   • ENDOSCOPY  07-[facility]    w/EGD-Erich Wasserman MD   • HAND SURGERY     • LAPAROSCOPIC TUBAL LIGATION         Family History   Problem Relation Age of Onset   • Heart attack Mother    • Coronary artery disease Mother         Had CABG   • Heart attack Father          Age 63       Social History     Socioeconomic History   • Marital status:      Spouse name: Don   • Number of children: Not on file   • Years of education: Not on file   • Highest education level: Not on file   Occupational History   • Occupation: UNEMPLOYED   Tobacco Use   • Smoking status: Former Smoker     Packs/day: 1.00     Types: Cigarettes     Last attempt to quit: 2018     Years since quittin.4   • Smokeless tobacco: Never Used   Substance and Sexual Activity   • Alcohol use: No   • Drug use: No   • Sexual  activity: Defer     Birth control/protection: Post-menopausal, Surgical     Comment: TL           Objective   Physical Exam   Constitutional: She is oriented to person, place, and time. She appears well-developed and well-nourished. No distress.   HENT:   Head: Normocephalic and atraumatic.   Mouth/Throat: Oropharynx is clear and moist.   Eyes: Pupils are equal, round, and reactive to light. EOM are normal. No scleral icterus.   Neck: Normal range of motion. Neck supple.   Cardiovascular: Normal rate, regular rhythm and intact distal pulses. Exam reveals no gallop and no friction rub.   No murmur heard.  Pulmonary/Chest: Effort normal. No stridor. She has no wheezes. She has no rales.   Musculoskeletal: She exhibits edema, tenderness and deformity.        Left shoulder: She exhibits decreased range of motion. She exhibits no tenderness.        Left elbow: She exhibits decreased range of motion. No tenderness found.        Left wrist: She exhibits normal range of motion, no tenderness, no swelling, no crepitus and no deformity.        Cervical back: She exhibits normal range of motion and no tenderness.        Left upper arm: She exhibits tenderness, swelling and deformity. She exhibits no laceration.        Left forearm: She exhibits no tenderness, no swelling, no edema and no deformity.   Left upper extremity:  Shoulder: There is no erythema induration or warmth noted.  The patient has decreased range of motion about the shoulder secondary to humerus pain.  Deformity is noted just to the distal aspect of the left shoulder with some localized edema.  Peripheral pulses intact compartment soft.   Neurological: She is alert and oriented to person, place, and time. No cranial nerve deficit or sensory deficit.   Skin: Skin is warm. Capillary refill takes less than 2 seconds. No rash noted. She is not diaphoretic.   Psychiatric: She has a normal mood and affect. Her behavior is normal.   Nursing note and vitals  "reviewed.      Splint - Cast - Strapping  Date/Time: 2/9/2020 11:05 AM  Performed by: Polina Ware APRN  Authorized by: Marky Ritchie MD     Consent:     Consent obtained:  Verbal    Consent given by:  Patient    Risks discussed:  Discoloration, numbness, pain and swelling  Pre-procedure details:     Sensation:  Normal  Procedure details:     Laterality:  Left    Location:  Arm    Arm:  L upper arm    Splint type:  Sugar tong    Supplies:  Ortho-Glass  Post-procedure details:     Pain:  Unchanged    Sensation:  Normal    Patient tolerance of procedure:  Tolerated well, no immediate complications               ED Course  ED Course as of Feb 09 1149   Sun Feb 09, 2020   1148 Preop labs were obtained.  Patient was found to have potassium of 6.2 with creatinine 1.15.  She was given a 500 cc normal saline fluid bolus as well as dextrose insulin calcium gluconate and Kayexalate.    [MD]      ED Course User Index  [MD] Polina Ware APRN    BP (!) 177/106 (BP Location: Right arm, Patient Position: Standing)   Pulse 76   Temp 97.5 °F (36.4 °C) (Oral)   Resp 16   Ht 167.6 cm (66\")   Wt 84.8 kg (186 lb 15.2 oz)   SpO2 92%   BMI 30.17 kg/m²   Medications   sodium chloride 0.9 % flush 10 mL (10 mL Intravenous Given 2/9/20 1111)   dextrose (D50W) 25 g/ 50mL Intravenous Solution 50 mL (has no administration in time range)   insulin regular (humuLIN R,novoLIN R) injection 10 Units (has no administration in time range)   calcium gluconate 1g/50ml 0.675% NaCl IV SOLN (has no administration in time range)   sodium polystyrene (KAYEXALATE) 15 GM/60ML suspension 15 g (has no administration in time range)   sodium chloride 0.9 % bolus 500 mL (has no administration in time range)   Morphine sulfate (PF) injection 4 mg (4 mg Intravenous Given 2/9/20 0929)   ondansetron (ZOFRAN) injection 4 mg (4 mg Intravenous Given 2/9/20 0929)   ondansetron (ZOFRAN) injection 4 mg (4 mg Intravenous Given 2/9/20 1019)   Morphine sulfate " (PF) injection 4 mg (4 mg Intravenous Given 2/9/20 1111)   aluminum-magnesium hydroxide-simethicone (MAALOX MAX) 400-400-40 MG/5ML suspension 15 mL (15 mL Oral Given 2/9/20 1114)   Lidocaine Viscous HCl (XYLOCAINE) 2 % mouth solution 15 mL (15 mL Mouth/Throat Given 2/9/20 1116)     Labs Reviewed   BASIC METABOLIC PANEL - Abnormal; Notable for the following components:       Result Value    Glucose 221 (*)     Creatinine 1.15 (*)     Potassium 6.2 (*)     eGFR Non  Amer 46 (*)     All other components within normal limits    Narrative:     GFR Normal >60  Chronic Kidney Disease <60  Kidney Failure <15     APTT - Abnormal; Notable for the following components:    PTT 23.4 (*)     All other components within normal limits   PROTIME-INR - Normal   CBC WITH AUTO DIFFERENTIAL - Normal   CBC AND DIFFERENTIAL    Narrative:     The following orders were created for panel order CBC & Differential.  Procedure                               Abnormality         Status                     ---------                               -----------         ------                     CBC Auto Differential[[id number]]        Normal              Final result                 Please view results for these tests on the individual orders.   EXTRA TUBES    Narrative:     The following orders were created for panel order Extra Tubes.  Procedure                               Abnormality         Status                     ---------                               -----------         ------                     Gold Top - SST[[id number]]                                   In process                   Please view results for these tests on the individual orders.   GOLD TOP - SST     Xr Humerus Left    Result Date: 2/9/2020  Comminuted fracture of the proximal third of the humerus is noted as described above  Electronically Signed By-Jeremiah Gonzalez On:2/9/2020 10:05 AM This report was finalized on [id number] by  Jeremiah Gonzalez, .    Xr Chest 1  View    Result Date: 2/9/2020   1. Chronic postoperative changes noted about the chest. There is no obvious pneumonia or congestive change   Electronically Signed By-Jeremiah Gonzalez On:2/9/2020 10:06 AM This report was finalized on [id number] by  Jeremiah Gonzalez, .    Labs Reviewed   BASIC METABOLIC PANEL - Abnormal; Notable for the following components:       Result Value    Glucose 221 (*)     Creatinine 1.15 (*)     Potassium 6.2 (*)     eGFR Non  Amer 46 (*)     All other components within normal limits    Narrative:     GFR Normal >60  Chronic Kidney Disease <60  Kidney Failure <15     APTT - Abnormal; Notable for the following components:    PTT 23.4 (*)     All other components within normal limits   PROTIME-INR - Normal   CBC WITH AUTO DIFFERENTIAL - Normal   CBC AND DIFFERENTIAL    Narrative:     The following orders were created for panel order CBC & Differential.  Procedure                               Abnormality         Status                     ---------                               -----------         ------                     CBC Auto Differential[[id number]]        Normal              Final result                 Please view results for these tests on the individual orders.   EXTRA TUBES    Narrative:     The following orders were created for panel order Extra Tubes.  Procedure                               Abnormality         Status                     ---------                               -----------         ------                     Gold Top - SST[[id number]]                                   In process                   Please view results for these tests on the individual orders.   GOLD TOP - SST     Xr Humerus Left    Result Date: 2/9/2020  Comminuted fracture of the proximal third of the humerus is noted as described above  Electronically Signed By-Jeremiah Gonzalez On:2/9/2020 10:05 AM This report was finalized on [id number] by  Jeremiah Gonzalez, .    Xr Chest 1  View    Result Date: 2/9/2020   1. Chronic postoperative changes noted about the chest. There is no obvious pneumonia or congestive change   Electronically Signed By-Jeremiah Gonzalez On:2/9/2020 10:06 AM This report was finalized on [id number] by  Jeremiah Gonzalez, .    Medications   sodium chloride 0.9 % flush 10 mL (10 mL Intravenous Given 2/9/20 1111)   dextrose (D50W) 25 g/ 50mL Intravenous Solution 50 mL (has no administration in time range)   insulin regular (humuLIN R,novoLIN R) injection 10 Units (has no administration in time range)   calcium gluconate 1g/50ml 0.675% NaCl IV SOLN (has no administration in time range)   sodium polystyrene (KAYEXALATE) 15 GM/60ML suspension 15 g (has no administration in time range)   sodium chloride 0.9 % bolus 500 mL (has no administration in time range)   Morphine sulfate (PF) injection 4 mg (4 mg Intravenous Given 2/9/20 0929)   ondansetron (ZOFRAN) injection 4 mg (4 mg Intravenous Given 2/9/20 0929)   ondansetron (ZOFRAN) injection 4 mg (4 mg Intravenous Given 2/9/20 1019)   Morphine sulfate (PF) injection 4 mg (4 mg Intravenous Given 2/9/20 1111)   aluminum-magnesium hydroxide-simethicone (MAALOX MAX) 400-400-40 MG/5ML suspension 15 mL (15 mL Oral Given 2/9/20 1114)   Lidocaine Viscous HCl (XYLOCAINE) 2 % mouth solution 15 mL (15 mL Mouth/Throat Given 2/9/20 1116)                                                MDM  Number of Diagnoses or Management Options  Fall, initial encounter:   Other closed displaced fracture of proximal end of left humerus, initial encounter:   Diagnosis management comments: Comorbidities: Hypertension, high cholesterol, diabetes, CAD with CABG, COPD, chronic kidney disease  Differentials: Fracture, dislocation, contusion;this list is not all inclusive and does not constitute the entirety of considered causes  Discussion with provider: Jun  Radiology interpretation: X-rays reviewed by me and interpreted by radiologist: As  above  Lab interpretation: Labs viewed by me significant for: As above    Patient had an IV established.  She had x-rays obtained.  She was given morphine and Zofran for pain initially.  Work-up was initiated in the ROX.    On my examination in the main ED, patient is resting comfortably in no acute distress however she does complain of persistent pain in her left upper arm.  There is deformity noted with palpable tenderness.  There is no underlying erythema or ecchymosis.  Good sensation and cap refill distally.  Radial ulnar median and axillary nerves intact.  Patient was given additional dose of morphine as well as Zofran.  She was also given a GI cocktail for her indigestion which she states is chronic for her.  Vital signs are stable.  She remains well-appearing.    Patient was placed in a sugar tong Ortho-Glass splint as noted above.  She was discussed with Dr. Uribe, on-call orthopedic surgeon who agrees to see the patient.  She was also discussed with Dr. Smith on-call for Dr. Ritchie who agreed to admit.    Diagnosis and treatment plan discussed with patient.  Patient agreeable to plan.            Amount and/or Complexity of Data Reviewed  Clinical lab tests: ordered and reviewed  Tests in the radiology section of CPT®: ordered and reviewed    Patient Progress  Patient progress: stable      Final diagnoses:   Fall, initial encounter   Other closed displaced fracture of proximal end of left humerus, initial encounter   Hyperkalemia            Polina Ware APRN  02/09/20 1105       Polina Ware APRN  02/09/20 1149

## 2020-02-09 NOTE — H&P
Patient Care Team:  Marky Ritchie MD as PCP - General (Family Medicine)  Ernst Hurtado MD as Consulting Physician (Cardiology)    Chief complaint arm pain    Subjective     Patient is a 70 y.o. female presents with a fall at home.  She was in the bathroom and bent over to scratch her ankle, lost her balance and hit her left shoulder on the bathtub.  She was found to have moderately displaced, comminuted left proximal humerus fracture.  She denies any chest pain, dizziness or near syncope leading to her fall.  She did not hit her head.  She has been in her usual state of health prior to falling this morning.  She has been admitted for pain control and orthopedic evaluation.    Review of Systems   Constitutional: Positive for activity change. Negative for appetite change, diaphoresis, fatigue and fever.   HENT: Negative for mouth sores.    Eyes: Negative for visual disturbance.   Respiratory: Negative for cough, choking, chest tightness, shortness of breath, wheezing and stridor.    Cardiovascular: Negative for chest pain, palpitations and leg swelling.   Gastrointestinal: Negative for blood in stool, constipation, diarrhea, nausea and vomiting.   Endocrine: Negative for polyuria.   Genitourinary: Negative for frequency and urgency.   Musculoskeletal: Positive for arthralgias and joint swelling. Negative for back pain, gait problem, myalgias, neck pain and neck stiffness.   Skin: Negative for rash and wound.   Neurological: Negative for seizures and headaches.   Hematological: Negative for adenopathy.   Psychiatric/Behavioral: Negative for confusion.          History  Past Medical History:   Diagnosis Date   • 3-vessel CAD 09/06/2018    Severe on Cardiac Cath; Daily Aspirin--S/p CABG   • Abnormal EKG 09/04/2018   • Anxiety     Xanax   • Breast pain, left 09/06/2018    Per Pt   • Cataract    • Chest pain 09/2018   • Cholecystitis 07/2007    S/p Cholecystectomy   • Cholecystolithiasis 07/2007    S/p  Cholecystectomy   • Colitis    • COPD (chronic obstructive pulmonary disease) (CMS/[facility])    • Diverticulosis    • DM (diabetes mellitus) (CMS/[facility]) Since 1948    T2--Metformin Currently   • Dyslipidemia     Controlled w/Meds   • Family history of heart disease     Mother & Father Both Had MI's   • Gastritis    • History of bone density study 2008-[facility]    NL   • Hx of chest x-ray 18-[facility]    WNL w/Calcified Atherosclerotic Disease in Thoracic Aorta   • Hx of CT scan of chest 18-[facility]    Normal Findings with Degenerative Changes of Spine; No Evidence of Pulmonary Embolous Seen; 2.1CM Thyroid Nodule Noted   • Hypertension Since 1948    Controlled w/Meds   • Kidney disease, chronic, stage III (GFR 30-59 ml/min) (CMS/[facility])    • Nasal congestion 2016    Treated @ [facility] w/Antibiotics   • Postmenopausal    • Renal disease    • Ringing of ears, bilateral    • SOB (shortness of breath)     Chronic   • Stress    • Tobacco use     1 PPD     Past Surgical History:   Procedure Laterality Date   • BREAST SURGERY Right     biopsy   • CARDIAC CATHETERIZATION  18-[facility]    Dr. Hurtado--Severe L Main 3-V CAD   • CARDIAC SURGERY     • CHOLECYSTECTOMY  2007    Lap--Dusty Hernandez MD   • COLONOSCOPY     • COLONOSCOPY N/A 2019    Procedure: COLONOSCOPY with polypectomy X2;  Surgeon: aJzz More MD;  Location: [facility] ENDOSCOPY;  Service: Gastroenterology   • CORONARY ARTERY BYPASS GRAFT  18-[facility]    Urgent x 4 with Left Saphenous Vein Grafting-Dr. Palmer   • ENDOSCOPIC VEIN HARVEST  18-[facility]    Of R Lower Extremity-Dr. Palmer   • ENDOSCOPY  07-[facility]    w/EGD-Erich Wasserman MD   • HAND SURGERY     • LAPAROSCOPIC TUBAL LIGATION       Family History   Problem Relation Age of Onset   • Heart attack Mother    • Coronary artery disease Mother         Had CABG   • Heart attack Father          Age 63     Social History     Tobacco Use   • Smoking status: Former  Smoker     Packs/day: 1.00     Types: Cigarettes     Last attempt to quit: 2018     Years since quittin.4   • Smokeless tobacco: Never Used   Substance Use Topics   • Alcohol use: No   • Drug use: No     Medications Prior to Admission   Medication Sig Dispense Refill Last Dose   • ALPRAZolam (XANAX) 1 MG tablet Take 1 mg by mouth 4 (Four) Times a Day.   2020 at Unknown time   • aspirin 81 MG tablet Take 325 mg by mouth Daily.   2020 at Unknown time   • atorvastatin (LIPITOR) 40 MG tablet Take 40 mg by mouth Daily.   2020 at Unknown time   • busPIRone (BUSPAR) 10 MG tablet Take 10 mg by mouth 2 (Two) Times a Day.   2020 at Unknown time   • calcium carbonate (TUMS) 500 MG chewable tablet Chew 1 tablet 3 (Three) Times a Day As Needed for Indigestion or Heartburn.   2020 at Unknown time   • furosemide (LASIX) 20 MG tablet Take 20 mg by mouth 2 (Two) Times a Week. Monday and 2020 at Unknown time   • ipratropium-albuterol (DUO-NEB) 0.5-2.5 mg/3 ml nebulizer Take 3 mL by nebulization 4 (Four) Times a Day.   2020 at Unknown time   • metFORMIN (GLUCOPHAGE) 500 MG tablet Take 500 mg by mouth 2 (Two) Times a Day With Meals.   2020 at Unknown time   • nateglinide (STARLIX) 120 MG tablet Take 120 mg by mouth 2 (Two) Times a Day.   2020 at Unknown time     Allergies:  Codeine    Objective     Vital Signs  Temp:  [97.5 °F (36.4 °C)-98.1 °F (36.7 °C)] 98.1 °F (36.7 °C)  Heart Rate:  [71-97] 80  Resp:  [16] 16  BP: (110-177)/() 157/76     Physical Exam:      General Appearance:    Alert, cooperative, in no acute distress   Head:    Normocephalic, without obvious abnormality, atraumatic   Eyes:            Lids and lashes normal, conjunctivae and sclerae normal, no   icterus, no pallor, corneas clear, PERRLA   Ears:    Ears appear intact with no abnormalities noted   Throat:   No oral lesions, no thrush, oral mucosa moist   Neck:   No adenopathy, supple, trachea midline, no  thyromegaly, no   carotid bruit, no JVD   Lungs:     Clear to auscultation,respirations regular, even and                  unlabored    Heart:    Regular rhythm and normal rate, normal S1 and S2, no            murmur, no gallop, no rub, no click   Chest Wall:    No abnormalities observed   Abdomen:     Normal bowel sounds, no masses, no organomegaly, soft        non-tender, non-distended, no guarding, no rebound                tenderness   Extremities:  Left arm is immobilized in a splint; it is neurovascularly intact distally   Pulses:   Pulses palpable and equal bilaterally   Skin:   No bleeding, bruising or rash   Lymph nodes:   No palpable adenopathy   Neurologic:   Cranial nerves 2 - 12 grossly intact, sensation intact, DTR       present and equal bilaterally       Results Review:     Imaging Results (Last 24 Hours)     Procedure Component Value Units Date/Time    XR Chest 1 View [[id number]] Collected:  02/09/20 1005     Updated:  02/09/20 1008    Narrative:       XR CHEST 1 VW-     Date of Exam: 2/9/2020 9:50 AM     Indication: pre op; W19.XXXA-Unspecified fall, initial encounter.     Comparison:  12/11/2018     Technique: Single view of the chest was obtained.     FINDINGS: The patient has undergone previous coronary bypass graft  surgery. The heart and pulmonary vessels are within normal limits. No  consolidative process or congestive change is noted. The mediastinum is  within normal limits. The bones show no focal abnormalities.       Impression:          1. Chronic postoperative changes noted about the chest. There is no  obvious pneumonia or congestive change        Electronically Signed By-Jeremiah Gonzalez On:2/9/2020 10:06 AM  This report was finalized on [id number] by  Jeremiah Gonzalez, .    XR Humerus Left [[id number]] Collected:  02/09/20 1005     Updated:  02/09/20 1007    Narrative:       DATE OF EXAM:  2/9/2020 9:50 AM     PROCEDURE:  XR HUMERUS LEFT-     INDICATIONS:  fall     COMPARISON:  No  Comparisons Available     TECHNIQUE:   AP and lateral radiographic views were obtained of the left humerus.        FINDINGS:  There is a comminuted fracture involving the proximal third of the  humerus with moderate distraction and angulation of the fracture  fragments.        Impression:       Comminuted fracture of the proximal third of the humerus is noted as  described above     Electronically Signed By-Jeremiah Gonzalez On:2/9/2020 10:05 AM  This report was finalized on [id number] by  Jeremiah Gonzalez, .           Lab Results (last 24 hours)     Procedure Component Value Units Date/Time    Extra Tubes [[id number]] Collected:  02/09/20 1057    Specimen:  Blood from Arm, Right Updated:  02/09/20 1201    Narrative:       The following orders were created for panel order Extra Tubes.  Procedure                               Abnormality         Status                     ---------                               -----------         ------                     Gold Top - SST[[id number]]                                   Final result                 Please view results for these tests on the individual orders.    Gold Top - SST [[id number]] Collected:  02/09/20 1057    Specimen:  Blood from Arm, Right Updated:  02/09/20 1201     Extra Tube Hold for add-ons.     Comment: Auto resulted.       Basic Metabolic Panel [[id number]]  (Abnormal) Collected:  02/09/20 1053    Specimen:  Blood from Arm, Right Updated:  02/09/20 1127     Glucose 221 mg/dL      BUN 21 mg/dL      Creatinine 1.15 mg/dL      Sodium 141 mmol/L      Potassium 6.2 mmol/L      Chloride 106 mmol/L      CO2 24.0 mmol/L      Calcium 8.9 mg/dL      eGFR Non African Amer 46 mL/min/1.73      BUN/Creatinine Ratio 18.3     Anion Gap 11.0 mmol/L     Narrative:       GFR Normal >60  Chronic Kidney Disease <60  Kidney Failure <15      Protime-INR [[id number]]  (Normal) Collected:  02/09/20 1015    Specimen:  Blood Updated:  02/09/20 1028     Protime 9.9 Seconds      INR  0.92    aPTT [[id number]]  (Abnormal) Collected:  02/09/20 1015    Specimen:  Blood Updated:  02/09/20 1028     PTT 23.4 seconds     CBC & Differential [[id number]] Collected:  02/09/20 1015    Specimen:  Blood Updated:  02/09/20 1021    Narrative:       The following orders were created for panel order CBC & Differential.  Procedure                               Abnormality         Status                     ---------                               -----------         ------                     CBC Auto Differential[[id number]]        Normal              Final result                 Please view results for these tests on the individual orders.    CBC Auto Differential [[id number]]  (Normal) Collected:  02/09/20 1015    Specimen:  Blood Updated:  02/09/20 1021     WBC 8.20 10*3/mm3      RBC 4.50 10*6/mm3      Hemoglobin 14.3 g/dL      Hematocrit 42.1 %      MCV 93.7 fL      MCH 31.8 pg      MCHC 33.9 g/dL      RDW 14.7 %      RDW-SD 48.6 fl      MPV 9.8 fL      Platelets 292 10*3/mm3      Neutrophil % 52.6 %      Lymphocyte % 37.8 %      Monocyte % 5.2 %      Eosinophil % 3.5 %      Basophil % 0.9 %      Neutrophils, Absolute 4.30 10*3/mm3      Lymphocytes, Absolute 3.10 10*3/mm3      Monocytes, Absolute 0.40 10*3/mm3      Eosinophils, Absolute 0.30 10*3/mm3      Basophils, Absolute 0.10 10*3/mm3      nRBC 0.1 /100 WBC            I reviewed the patient's new clinical results.    Assessment/Plan       Fall  Left comminuted displaced proximal humerus fracture  Hyperkalemia  Chronic coronary artery disease  Chronic kidney disease stage III  Panlobular emphysema  Nicotine abuse  Generalized anxiety disorder  Essential hypertension  Next hyperlipidemia    Pain is currently controlled with splinting and morphine.  Elevated potassium was treated in the ER and will be rechecked now.  She is medically stable for surgical intervention.  Home medications for her other chronic problems will be continued.    I discussed the patients  findings and my recommendations with patient.     Nadine Smith MD  02/09/20  1:58 PM

## 2020-02-10 VITALS
OXYGEN SATURATION: 98 % | SYSTOLIC BLOOD PRESSURE: 120 MMHG | DIASTOLIC BLOOD PRESSURE: 74 MMHG | WEIGHT: 191.8 LBS | BODY MASS INDEX: 30.1 KG/M2 | HEIGHT: 67 IN | RESPIRATION RATE: 21 BRPM | HEART RATE: 81 BPM | TEMPERATURE: 98.4 F

## 2020-02-10 LAB
ANION GAP SERPL CALCULATED.3IONS-SCNC: 12 MMOL/L (ref 5–15)
BUN BLD-MCNC: 22 MG/DL (ref 8–23)
BUN/CREAT SERPL: 15.3 (ref 7–25)
CALCIUM SPEC-SCNC: 8.4 MG/DL (ref 8.6–10.5)
CHLORIDE SERPL-SCNC: 100 MMOL/L (ref 98–107)
CO2 SERPL-SCNC: 23 MMOL/L (ref 22–29)
CREAT BLD-MCNC: 1.44 MG/DL (ref 0.57–1)
GFR SERPL CREATININE-BSD FRML MDRD: 36 ML/MIN/1.73
GLUCOSE BLD-MCNC: 193 MG/DL (ref 65–99)
GLUCOSE BLDC GLUCOMTR-MCNC: 170 MG/DL (ref 70–105)
GLUCOSE BLDC GLUCOMTR-MCNC: 173 MG/DL (ref 70–105)
GLUCOSE BLDC GLUCOMTR-MCNC: 220 MG/DL (ref 70–105)
POTASSIUM BLD-SCNC: 4.4 MMOL/L (ref 3.5–5.2)
SODIUM BLD-SCNC: 135 MMOL/L (ref 136–145)

## 2020-02-10 PROCEDURE — G0378 HOSPITAL OBSERVATION PER HR: HCPCS

## 2020-02-10 PROCEDURE — 94799 UNLISTED PULMONARY SVC/PX: CPT

## 2020-02-10 PROCEDURE — 96376 TX/PRO/DX INJ SAME DRUG ADON: CPT

## 2020-02-10 PROCEDURE — 25010000002 MORPHINE PER 10 MG: Performed by: INTERNAL MEDICINE

## 2020-02-10 PROCEDURE — 82962 GLUCOSE BLOOD TEST: CPT

## 2020-02-10 PROCEDURE — 25010000002 ONDANSETRON PER 1 MG: Performed by: INTERNAL MEDICINE

## 2020-02-10 PROCEDURE — 80048 BASIC METABOLIC PNL TOTAL CA: CPT | Performed by: INTERNAL MEDICINE

## 2020-02-10 RX ORDER — OXYCODONE HYDROCHLORIDE 5 MG/1
10 TABLET ORAL EVERY 4 HOURS PRN
Status: DISCONTINUED | OUTPATIENT
Start: 2020-02-10 | End: 2020-02-10 | Stop reason: HOSPADM

## 2020-02-10 RX ORDER — HYDROCODONE/ACETAMINOPHEN 10MG-325MG
1 TABLET ORAL EVERY 6 HOURS PRN
Qty: 28 TABLET | Refills: 0 | Status: SHIPPED | OUTPATIENT
Start: 2020-02-10 | End: 2020-06-04

## 2020-02-10 RX ADMIN — IPRATROPIUM BROMIDE AND ALBUTEROL SULFATE 3 ML: .5; 3 SOLUTION RESPIRATORY (INHALATION) at 11:42

## 2020-02-10 RX ADMIN — DOCUSATE SODIUM 100 MG: 100 CAPSULE, LIQUID FILLED ORAL at 09:36

## 2020-02-10 RX ADMIN — SENNOSIDES 1 TABLET: 8.6 TABLET, FILM COATED ORAL at 09:37

## 2020-02-10 RX ADMIN — METFORMIN HYDROCHLORIDE 500 MG: 500 TABLET, FILM COATED ORAL at 09:36

## 2020-02-10 RX ADMIN — MORPHINE SULFATE 4 MG: 4 INJECTION INTRAVENOUS at 03:23

## 2020-02-10 RX ADMIN — OXYCODONE HYDROCHLORIDE 10 MG: 5 TABLET ORAL at 10:42

## 2020-02-10 RX ADMIN — BUSPIRONE HYDROCHLORIDE 10 MG: 10 TABLET ORAL at 09:37

## 2020-02-10 RX ADMIN — ALPRAZOLAM 1 MG: 1 TABLET ORAL at 09:36

## 2020-02-10 RX ADMIN — OXYCODONE HYDROCHLORIDE 10 MG: 5 TABLET ORAL at 15:41

## 2020-02-10 RX ADMIN — PANTOPRAZOLE SODIUM 40 MG: 40 TABLET, DELAYED RELEASE ORAL at 05:30

## 2020-02-10 RX ADMIN — ATORVASTATIN CALCIUM 40 MG: 40 TABLET, FILM COATED ORAL at 09:36

## 2020-02-10 RX ADMIN — IPRATROPIUM BROMIDE AND ALBUTEROL SULFATE 3 ML: .5; 3 SOLUTION RESPIRATORY (INHALATION) at 08:08

## 2020-02-10 RX ADMIN — Medication 10 ML: at 09:36

## 2020-02-10 RX ADMIN — IPRATROPIUM BROMIDE AND ALBUTEROL SULFATE 3 ML: .5; 3 SOLUTION RESPIRATORY (INHALATION) at 16:00

## 2020-02-10 RX ADMIN — ALPRAZOLAM 1 MG: 1 TABLET ORAL at 03:23

## 2020-02-10 RX ADMIN — ONDANSETRON 4 MG: 2 INJECTION INTRAMUSCULAR; INTRAVENOUS at 03:23

## 2020-02-10 NOTE — PROGRESS NOTES
LOS: 0 days   Patient Care Team:  Marky Ritchie MD as PCP - General (Family Medicine)  Ernst Hurtado MD as Consulting Physician (Cardiology)    Subjective:  Pain    Objective:     Afebrile    Review of Systems:   Review of Systems   Constitutional: Negative.    Respiratory: Negative.    Cardiovascular: Negative.    Genitourinary: Negative.    Musculoskeletal: Positive for arthralgias.           Vital Signs  Temp:  [97.5 °F (36.4 °C)-98.7 °F (37.1 °C)] 98.7 °F (37.1 °C)  Heart Rate:  [71-97] 90  Resp:  [15-19] 18  BP: (110-177)/() 120/74    Physical Exam:  Physical Exam   Constitutional: She appears well-developed and well-nourished.   HENT:   Head: Normocephalic and atraumatic.   Eyes: Pupils are equal, round, and reactive to light. EOM are normal.   Cardiovascular: Regular rhythm.   Pulmonary/Chest: Effort normal.   Abdominal: Soft.   Neurological: She is alert.   Nursing note and vitals reviewed.       Radiology:  Xr Humerus Left    Result Date: 2/9/2020  Comminuted fracture of the proximal third of the humerus is noted as described above  Electronically Signed By-Jeremiah Gonzalez On:2/9/2020 10:05 AM This report was finalized on [id number] by  Jeremiah Gonzalez, .    Xr Chest 1 View    Result Date: 2/9/2020   1. Chronic postoperative changes noted about the chest. There is no obvious pneumonia or congestive change   Electronically Signed ByBahman Gonzalez On:2/9/2020 10:06 AM This report was finalized on [id number] by  Jeremiah Gonzalez, .         Results Review:     I reviewed the patient's new clinical results.  I reviewed the patient's new imaging results and agree with the interpretation.    Medication Review:   Scheduled Meds:  atorvastatin 40 mg Oral Daily   busPIRone 10 mg Oral BID   docusate sodium 100 mg Oral BID   ipratropium-albuterol 3 mL Nebulization 4x Daily - RT   metFORMIN 500 mg Oral BID With Meals   nateglinide 120 mg Oral BID With Meals   pantoprazole 40 mg Oral Q AM   senna  1 tablet Oral BID   sodium chloride 10 mL Intravenous Q12H     Continuous Infusions:   PRN Meds:.•  ALPRAZolam  •  aluminum-magnesium hydroxide-simethicone  •  calcium carbonate  •  magnesium hydroxide  •  Morphine  •  ondansetron  •  [COMPLETED] Insert peripheral IV **AND** sodium chloride  •  sodium chloride    Labs:    CBC    Results from last 7 days   Lab Units 02/09/20  1015   WBC 10*3/mm3 8.20   HEMOGLOBIN g/dL 14.3   PLATELETS 10*3/mm3 292     BMP Results from last 7 days   Lab Units 02/10/20  0352 02/09/20  1559 02/09/20  1053   SODIUM mmol/L 135*  --  141   POTASSIUM mmol/L 4.4 5.2 6.2*   CHLORIDE mmol/L 100  --  106   CO2 mmol/L 23.0  --  24.0   BUN mg/dL 22  --  21   CREATININE mg/dL 1.44*  --  1.15*   GLUCOSE mg/dL 193*  --  221*     Cr Clearance Estimated Creatinine Clearance: 39.6 mL/min (A) (by C-G formula based on SCr of 1.44 mg/dL (H)).  Coag   Results from last 7 days   Lab Units 02/09/20  1015   INR  0.92   APTT seconds 23.4*     HbA1C   Lab Results   Component Value Date    HGBA1C 6.0 (H) 08/30/2019     Blood Glucose   Glucose   Date/Time Value Ref Range Status   02/10/2020 0707 173 (H) 70 - 105 mg/dL Final     Comment:     Serial Number: [id number]:  [id number]   02/09/2020 2047 179 (H) 70 - 105 mg/dL Final     Comment:     Serial Number: [id number]:  [id number]   02/09/2020 1629 174 (H) 70 - 105 mg/dL Final     Comment:     Serial Number: [id number]:  [id number]     Infection     CMP Results from last 7 days   Lab Units 02/10/20  0352 02/09/20  1559 02/09/20  1053   SODIUM mmol/L 135*  --  141   POTASSIUM mmol/L 4.4 5.2 6.2*   CHLORIDE mmol/L 100  --  106   CO2 mmol/L 23.0  --  24.0   BUN mg/dL 22  --  21   CREATININE mg/dL 1.44*  --  1.15*   GLUCOSE mg/dL 193*  --  221*     UA      Radiology(recent) Xr Humerus Left    Result Date: 2/9/2020  Comminuted fracture of the proximal third of the humerus is noted as described above  Electronically Signed By-Jeremiah Gonzalez On:2/9/2020  10:05 AM This report was finalized on [id number] by  Jeremiah Gonzalez, .    Xr Chest 1 View    Result Date: 2/9/2020   1. Chronic postoperative changes noted about the chest. There is no obvious pneumonia or congestive change   Electronically Signed By-Jeremiah Gonzalez On:2/9/2020 10:06 AM This report was finalized on [id number] by  Jeremiah Gonzalez, .     Assessment:     Fall  Left comminuted displaced proximal humerus fracture  Hyperkalemia  Chronic coronary artery disease  Chronic kidney disease stage III  Panlobular emphysema  Nicotine abuse  Generalized anxiety disorder  Essential hypertension  Next hyperlipidemia    Plan:  We will likely need elective surgical intervention//will discuss with orthopedic surgery        Marky Ritchie MD  02/10/20  8:58 AM

## 2020-02-10 NOTE — PROGRESS NOTES
Discharge Planning Assessment   Flash     Patient Name: Kathy Wu  MRN: [id number]  Today's Date: 2/10/2020    Admit Date: 2/9/2020    Discharge Needs Assessment     Row Name 02/10/20 1400       Living Environment    Lives With  spouse    Current Living Arrangements  home/apartment/condo    Primary Care Provided by  self    Provides Primary Care For  no one    Family Caregiver if Needed  spouse;child(yarelis), adult    Quality of Family Relationships  helpful;supportive    Able to Return to Prior Arrangements  yes       Resource/Environmental Concerns    Resource/Environmental Concerns  none    Transportation Concerns  car, none       Transition Planning    Patient/Family Anticipates Transition to  home with family    Patient/Family Anticipated Services at Transition  none       Discharge Needs Assessment    Readmission Within the Last 30 Days  no previous admission in last 30 days    Concerns to be Addressed  denies needs/concerns at this time;no discharge needs identified    Equipment Currently Used at Home  nebulizer    Anticipated Changes Related to Illness  inability to care for self    Provided post acute provider list?  N/A    N/A Provider List Comment  denies dc needs at this time    Discharge Coordination/Progress  spoke with patient, spouse and daughter at bedside.  patient denies dc needs. She is up ad rosemary in room with  at her side.        Discharge Plan     Row Name 02/10/20 1402       Plan    Plan  Home with family           Durable Medical Equipment      Service Provider Request Status Selected Services Address Phone Number Fax Number    [facility] Pending - No Request Sent N/A [address] [address] [phone]       Yeny Anne RN 2/9/2020 Darrell5    Erika ross                        Demographic Summary     Row Name 02/10/20 1359       General Information    Admission Type  observation    Arrived From  emergency department     Required Notices Provided  Observation Status Notice    Referral Source  admission list    Reason for Consult  discharge planning    Preferred Language  English        Functional Status     Row Name 02/10/20 1359       Functional Status    Usual Activity Tolerance  moderate    Current Activity Tolerance  fair       Functional Status, IADL    Medications  independent    Meal Preparation  independent    Housekeeping  independent    Laundry  independent    Shopping  independent       Mental Status    General Appearance WDL  WDL       Mental Status Summary    Recent Changes in Mental Status/Cognitive Functioning  no changes       Employment/    Employment Status  retired                Adrianna Alvarenga RN

## 2020-02-10 NOTE — CONSULTS
Referring Provider: Dr. Marky Ritchie  Reason for Consultation: Left humeral shaft fracture    Patient Care Team:  Marky Ritchie MD as PCP - General (Family Medicine)  Ernst Hurtado MD as Consulting Physician (Cardiology)      Subjective .     History of present illness:  Kathy Wu is a 70 y.o. female who presents with left humeral shaft fracture after falling at home yesterday.  Patient is right-hand dominant.  No prior problems.  Pain is moderate to severe..  No complains of numbness or tingling.  No other injuries.  Patient had been seen by Dr. Flores placed in a coaptation brace.  Patient wanted a second opinion.      Review of Systems      History  Past Medical History:   Diagnosis Date   • 3-vessel CAD 09/06/2018    Severe on Cardiac Cath; Daily Aspirin--S/p CABG   • Abnormal EKG 09/04/2018   • Anxiety     Xanax   • Breast pain, left 09/06/2018    Per Pt   • Cataract    • Chest pain 09/2018   • Cholecystitis 07/2007    S/p Cholecystectomy   • Cholecystolithiasis 07/2007    S/p Cholecystectomy   • Colitis 2007   • COPD (chronic obstructive pulmonary disease) (CMS/[facility])    • Diverticulosis 2007   • DM (diabetes mellitus) (CMS/[facility]) Since 1948    T2--Metformin Currently   • Dyslipidemia     Controlled w/Meds   • Family history of heart disease     Mother & Father Both Had MI's   • Gastritis 2007   • History of bone density study 07/31/2008-[facility]    NL   • Hx of chest x-ray 09/4/18-[facility]    WNL w/Calcified Atherosclerotic Disease in Thoracic Aorta   • Hx of CT scan of chest 09/4/18-[facility]    Normal Findings with Degenerative Changes of Spine; No Evidence of Pulmonary Embolous Seen; 2.1CM Thyroid Nodule Noted   • Hypertension Since 1948    Controlled w/Meds   • Kidney disease, chronic, stage III (GFR 30-59 ml/min) (CMS/[facility])    • Nasal congestion 12/2016    Treated @ [facility] w/Antibiotics   • Postmenopausal    • Renal disease    • Ringing of ears, bilateral    • SOB (shortness of breath)      Chronic   • Stress    • Tobacco use     1 PPD     Past Surgical History:   Procedure Laterality Date   • BREAST SURGERY Right     biopsy   • CARDIAC CATHETERIZATION  18-[facility]    Dr. Hurtado--Severe L Main 3-V CAD   • CARDIAC SURGERY     • CHOLECYSTECTOMY  2007    Lap--Dusty Hernandez MD   • COLONOSCOPY     • COLONOSCOPY N/A 2019    Procedure: COLONOSCOPY with polypectomy X2;  Surgeon: Jazz More MD;  Location: [facility] ENDOSCOPY;  Service: Gastroenterology   • CORONARY ARTERY BYPASS GRAFT  18-[facility]    Urgent x 4 with Left Saphenous Vein Grafting-Dr. Palmer   • ENDOSCOPIC VEIN HARVEST  18-[facility]    Of R Lower Extremity-Dr. Palmer   • ENDOSCOPY  07-[facility]    w/EGD-Erich Wasserman MD   • HAND SURGERY     • LAPAROSCOPIC TUBAL LIGATION       Family History   Problem Relation Age of Onset   • Heart attack Mother    • Coronary artery disease Mother         Had CABG   • Heart attack Father          Age 63      Social History     Tobacco Use   • Smoking status: Former Smoker     Packs/day: 1.00     Types: Cigarettes     Last attempt to quit: 2018     Years since quittin.4   • Smokeless tobacco: Never Used   Substance Use Topics   • Alcohol use: No   • Drug use: No     Medications Prior to Admission   Medication Sig Dispense Refill Last Dose   • ALPRAZolam (XANAX) 1 MG tablet Take 1 mg by mouth 4 (Four) Times a Day.   2020 at Unknown time   • aspirin 81 MG tablet Take 325 mg by mouth Daily.   2020 at Unknown time   • atorvastatin (LIPITOR) 40 MG tablet Take 40 mg by mouth Daily.   2020 at Unknown time   • busPIRone (BUSPAR) 10 MG tablet Take 10 mg by mouth 2 (Two) Times a Day.   2020 at Unknown time   • calcium carbonate (TUMS) 500 MG chewable tablet Chew 1 tablet 3 (Three) Times a Day As Needed for Indigestion or Heartburn.   2020 at Unknown time   • furosemide (LASIX) 20 MG tablet Take 20 mg by mouth 2 (Two) Times a Week. Monday and 2020 at  Unknown time   • ipratropium-albuterol (DUO-NEB) 0.5-2.5 mg/3 ml nebulizer Take 3 mL by nebulization 4 (Four) Times a Day.   2/8/2020 at Unknown time   • metFORMIN (GLUCOPHAGE) 500 MG tablet Take 500 mg by mouth 2 (Two) Times a Day With Meals.   2/8/2020 at Unknown time   • nateglinide (STARLIX) 120 MG tablet Take 120 mg by mouth 2 (Two) Times a Day.   2/8/2020 at Unknown time      Codeine    Scheduled Meds:  atorvastatin 40 mg Oral Daily   busPIRone 10 mg Oral BID   docusate sodium 100 mg Oral BID   ipratropium-albuterol 3 mL Nebulization 4x Daily - RT   metFORMIN 500 mg Oral BID With Meals   nateglinide 120 mg Oral BID With Meals   pantoprazole 40 mg Oral Q AM   senna 1 tablet Oral BID   sodium chloride 10 mL Intravenous Q12H     Continuous Infusions:   PRN Meds:.•  ALPRAZolam  •  aluminum-magnesium hydroxide-simethicone  •  calcium carbonate  •  magnesium hydroxide  •  Morphine  •  ondansetron  •  oxyCODONE  •  [COMPLETED] Insert peripheral IV **AND** sodium chloride  •  sodium chloride    Objective     Vital Signs   Vitals:    02/10/20 0814 02/10/20 0836 02/10/20 1142 02/10/20 1150   BP:       BP Location:       Patient Position:       Pulse: 90  78 78   Resp: 17 18 18 18   Temp:  98.7 °F (37.1 °C)     TempSrc:  Oral     SpO2:  91% 92%    Weight:       Height:             Physical Exam:   Pleasant female, no apparent distress, alert and orient x3, sitting up in chair with coaptation brace in good position.  5/5 radial median and ulnar nerve function.  2+ radial pulse.  Sensation all digits good  X-ray shows segmental proximal third humeral shaft fracture with about 15 degrees apex lateral angulation and bone spike on the lateral distal fragment    Results Review:   I reviewed the patient's new clinical results.  I reviewed the patient's new imaging results    Lab Results (last 24 hours)     Procedure Component Value Units Date/Time    POC Glucose Once [[id number]]  (Abnormal) Collected:  02/10/20 1123     Specimen:  Blood Updated:  02/10/20 1134     Glucose 220 mg/dL      Comment: Serial Number: [id number]:  [id number]       POC Glucose Once [[id number]]  (Abnormal) Collected:  02/10/20 0707    Specimen:  Blood Updated:  02/10/20 0708     Glucose 173 mg/dL      Comment: Serial Number: [id number]:  [id number]       Basic Metabolic Panel [[id number]]  (Abnormal) Collected:  02/10/20 0352    Specimen:  Blood Updated:  02/10/20 0451     Glucose 193 mg/dL      BUN 22 mg/dL      Creatinine 1.44 mg/dL      Sodium 135 mmol/L      Potassium 4.4 mmol/L      Chloride 100 mmol/L      CO2 23.0 mmol/L      Calcium 8.4 mg/dL      eGFR Non African Amer 36 mL/min/1.73      BUN/Creatinine Ratio 15.3     Anion Gap 12.0 mmol/L     Narrative:       GFR Normal >60  Chronic Kidney Disease <60  Kidney Failure <15      POC Glucose Once [[id number]]  (Abnormal) Collected:  02/09/20 2047    Specimen:  Blood Updated:  02/09/20 2048     Glucose 179 mg/dL      Comment: Serial Number: [id number]:  [id number]       POC Glucose Once [[id number]]  (Abnormal) Collected:  02/09/20 1629    Specimen:  Blood Updated:  02/09/20 1632     Glucose 174 mg/dL      Comment: Serial Number: [id number]:  [id number]       Potassium [[id number]]  (Normal) Collected:  02/09/20 1559    Specimen:  Blood Updated:  02/09/20 1624     Potassium 5.2 mmol/L           Imaging Results (Last 24 Hours)     ** No results found for the last 24 hours. **            Assessment/Plan     Left segmental humeral shaft fracture    I agree that this would be best treated with a coaptation brace.  Should take about 8 to 10 weeks to heal enough to get out of the brace.  Instructed patient's in pendulum exercises which she should do 3 times per day.  She should also sleep somewhat propped up.  We will x-ray in 6 weeks.  Explained if failed to heal could require open reduction internal fixation in the future with possible bone grafting but most of these heal well with some  angulation which functions well.  Patient wishes to follow with me will see me in 6 weeks      All Olsen MD  02/10/20  12:33 PM

## 2020-02-10 NOTE — CONSULTS
Referring Provider: Marky Ritchie  Reason for Consultation: Left shoulder and arm pain following a fall    Patient Care Team:  Marky Ritchie MD as PCP - General (Family Medicine)  Ernst Hurtado MD as Consulting Physician (Cardiology)    Chief complaint left shoulder and arm pain following a fall unable to move the shoulder and upper extremity.    Subjective .     History of present illness:  Patient is a 70 y.o. female presents with a fall at home.  She was in the bathroom and bent over to scratch her ankle, lost her balance and hit her left shoulder on the bathtub.  She was found to have moderately displaced, comminuted left proximal humerus fracture.  She denies any chest pain, dizziness or near syncope leading to her fall.  She did not hit her head.  She has been in her usual state of health prior to falling this morning.  She has been admitted for pain control and orthopedic evaluation.         History  Past Medical History:   Diagnosis Date   • 3-vessel CAD 09/06/2018    Severe on Cardiac Cath; Daily Aspirin--S/p CABG   • Abnormal EKG 09/04/2018   • Anxiety     Xanax   • Breast pain, left 09/06/2018    Per Pt   • Cataract    • Chest pain 09/2018   • Cholecystitis 07/2007    S/p Cholecystectomy   • Cholecystolithiasis 07/2007    S/p Cholecystectomy   • Colitis 2007   • COPD (chronic obstructive pulmonary disease) (CMS/[facility])    • Diverticulosis 2007   • DM (diabetes mellitus) (CMS/[facility]) Since 1948    T2--Metformin Currently   • Dyslipidemia     Controlled w/Meds   • Family history of heart disease     Mother & Father Both Had MI's   • Gastritis 2007   • History of bone density study 07/31/2008-[facility]    NL   • Hx of chest x-ray 09/4/18-[facility]    WNL w/Calcified Atherosclerotic Disease in Thoracic Aorta   • Hx of CT scan of chest 09/4/18-[facility]    Normal Findings with Degenerative Changes of Spine; No Evidence of Pulmonary Embolous Seen; 2.1CM Thyroid Nodule Noted   • Hypertension Since 1948     Controlled w/Meds   • Kidney disease, chronic, stage III (GFR 30-59 ml/min) (CMS/[facility])    • Nasal congestion 2016    Treated @ [facility] w/Antibiotics   • Postmenopausal    • Renal disease    • Ringing of ears, bilateral    • SOB (shortness of breath)     Chronic   • Stress    • Tobacco use     1 PPD       Past Surgical History:   Procedure Laterality Date   • BREAST SURGERY Right     biopsy   • CARDIAC CATHETERIZATION  18-[facility]    Dr. Hurtado--Severe L Main 3-V CAD   • CARDIAC SURGERY     • CHOLECYSTECTOMY  2007    Lap--Dusty Hernandez MD   • COLONOSCOPY     • COLONOSCOPY N/A 2019    Procedure: COLONOSCOPY with polypectomy X2;  Surgeon: Jazz More MD;  Location: [facility] ENDOSCOPY;  Service: Gastroenterology   • CORONARY ARTERY BYPASS GRAFT  18-[facility]    Urgent x 4 with Left Saphenous Vein Grafting-Dr. Palmer   • ENDOSCOPIC VEIN HARVEST  18-[facility]    Of R Lower Extremity-Dr. Palmer   • ENDOSCOPY  07-[facility]    w/EGD-Erich Wasserman MD   • HAND SURGERY     • LAPAROSCOPIC TUBAL LIGATION         Family History   Problem Relation Age of Onset   • Heart attack Mother    • Coronary artery disease Mother         Had CABG   • Heart attack Father          Age 63       Social History     Tobacco Use   • Smoking status: Former Smoker     Packs/day: 1.00     Types: Cigarettes     Last attempt to quit: 2018     Years since quittin.4   • Smokeless tobacco: Never Used   Substance Use Topics   • Alcohol use: No   • Drug use: No       Medications Prior to Admission   Medication Sig Dispense Refill Last Dose   • ALPRAZolam (XANAX) 1 MG tablet Take 1 mg by mouth 4 (Four) Times a Day.   2020 at Unknown time   • aspirin 81 MG tablet Take 325 mg by mouth Daily.   2020 at Unknown time   • atorvastatin (LIPITOR) 40 MG tablet Take 40 mg by mouth Daily.   2020 at Unknown time   • busPIRone (BUSPAR) 10 MG tablet Take 10 mg by mouth 2 (Two) Times a Day.   2020 at Unknown time   •  calcium carbonate (TUMS) 500 MG chewable tablet Chew 1 tablet 3 (Three) Times a Day As Needed for Indigestion or Heartburn.   2/8/2020 at Unknown time   • furosemide (LASIX) 20 MG tablet Take 20 mg by mouth 2 (Two) Times a Week. Monday and Friday 2/8/2020 at Unknown time   • ipratropium-albuterol (DUO-NEB) 0.5-2.5 mg/3 ml nebulizer Take 3 mL by nebulization 4 (Four) Times a Day.   2/8/2020 at Unknown time   • metFORMIN (GLUCOPHAGE) 500 MG tablet Take 500 mg by mouth 2 (Two) Times a Day With Meals.   2/8/2020 at Unknown time   • nateglinide (STARLIX) 120 MG tablet Take 120 mg by mouth 2 (Two) Times a Day.   2/8/2020 at Unknown time        Scheduled Meds:      atorvastatin 40 mg Oral Daily   busPIRone 10 mg Oral BID   docusate sodium 100 mg Oral BID   ipratropium-albuterol 3 mL Nebulization 4x Daily - RT   [START ON 2/10/2020] metFORMIN 500 mg Oral BID With Meals   nateglinide 120 mg Oral BID With Meals   [START ON 2/10/2020] pantoprazole 40 mg Oral Q AM   senna 1 tablet Oral BID   sodium chloride 10 mL Intravenous Q12H        Continuous Infusions:          PRN Meds:   •  ALPRAZolam  •  aluminum-magnesium hydroxide-simethicone  •  calcium carbonate  •  magnesium hydroxide  •  Morphine  •  ondansetron  •  [COMPLETED] Insert peripheral IV **AND** sodium chloride  •  sodium chloride      Allergies:    Codeine        Objective     Vital Signs   Temp:  [97.5 °F (36.4 °C)-98.4 °F (36.9 °C)] 98.3 °F (36.8 °C)  Heart Rate:  [71-97] 85  Resp:  [16-18] 18  BP: (110-177)/() 158/77    Physical Exam:    General Appearance:    Alert, cooperative, in no acute distress   Head:    Normocephalic, without obvious abnormality, atraumatic   Eyes:            Lids and lashes normal, conjunctivae and sclerae normal, no   icterus, no pallor, corneas clear, PERRLA   Ears:    Ears appear intact with no abnormalities noted   Throat:   No oral lesions, no thrush, oral mucosa moist   Neck:   No adenopathy, supple, trachea midline, no  thyromegaly, no   carotid bruit, no JVD   Back:     No kyphosis present, no scoliosis present, no skin lesions,      erythema or scars, no tenderness to percussion or                   palpation,   range of motion normal   Lungs:     Clear to auscultation,respirations regular, even and                  unlabored    Heart:    Regular rhythm and normal rate, normal S1 and S2, no            murmur, no gallop, no rub, no click   Chest Wall:    No abnormalities observed   Abdomen:     Normal bowel sounds, no masses, no organomegaly, soft        non-tender, non-distended, no guarding, no rebound                tenderness   Rectal:     Deferred   Extremities:  Left upper extremity is in a sugar tong splint.  Patient is able to move all her fingers.  Sensation of the hand for radial median ulnar nerves are normal.   strength is normal.   Pulses:  Radial pulses felt normal.   Skin:   No bleeding, bruising or rash   Lymph nodes:   No palpable adenopathy   Neurologic:  Station of the left hand is normal.  Patient is moving all the fingers.                            Results Review     Lab Results (most recent)     Procedure Component Value Units Date/Time    POC Glucose Once [[id number]]  (Abnormal) Collected:  02/09/20 2047    Specimen:  Blood Updated:  02/09/20 2048     Glucose 179 mg/dL      Comment: Serial Number: [id number]:  [id number]       POC Glucose Once [[id number]]  (Abnormal) Collected:  02/09/20 1629    Specimen:  Blood Updated:  02/09/20 1632     Glucose 174 mg/dL      Comment: Serial Number: [id number]:  [id number]       Potassium [[id number]]  (Normal) Collected:  02/09/20 1559    Specimen:  Blood Updated:  02/09/20 1624     Potassium 5.2 mmol/L     Extra Tubes [[id number]] Collected:  02/09/20 1057    Specimen:  Blood from Arm, Right Updated:  02/09/20 1201    Narrative:       The following orders were created for panel order Extra Tubes.  Procedure                               Abnormality          Status                     ---------                               -----------         ------                     [facility][[id number]]                                   Final result                 Please view results for these tests on the individual orders.    [facility] [[id number]] Collected:  02/09/20 1057    Specimen:  Blood from Arm, Right Updated:  02/09/20 1201     Extra Tube Hold for add-ons.     Comment: Auto resulted.       Basic Metabolic Panel [[id number]]  (Abnormal) Collected:  02/09/20 1053    Specimen:  Blood from Arm, Right Updated:  02/09/20 1127     Glucose 221 mg/dL      BUN 21 mg/dL      Creatinine 1.15 mg/dL      Sodium 141 mmol/L      Potassium 6.2 mmol/L      Chloride 106 mmol/L      CO2 24.0 mmol/L      Calcium 8.9 mg/dL      eGFR Non African Amer 46 mL/min/1.73      BUN/Creatinine Ratio 18.3     Anion Gap 11.0 mmol/L     Narrative:       GFR Normal >60  Chronic Kidney Disease <60  Kidney Failure <15      Protime-INR [[id number]]  (Normal) Collected:  02/09/20 1015    Specimen:  Blood Updated:  02/09/20 1028     Protime 9.9 Seconds      INR 0.92    aPTT [[id number]]  (Abnormal) Collected:  02/09/20 1015    Specimen:  Blood Updated:  02/09/20 1028     PTT 23.4 seconds     CBC & Differential [[id number]] Collected:  02/09/20 1015    Specimen:  Blood Updated:  02/09/20 1021    Narrative:       The following orders were created for panel order CBC & Differential.  Procedure                               Abnormality         Status                     ---------                               -----------         ------                     CBC Auto Differential[[id number]]        Normal              Final result                 Please view results for these tests on the individual orders.    CBC Auto Differential [[id number]]  (Normal) Collected:  02/09/20 1015    Specimen:  Blood Updated:  02/09/20 1021     WBC 8.20 10*3/mm3      RBC 4.50 10*6/mm3      Hemoglobin 14.3 g/dL      Hematocrit 42.1  %      MCV 93.7 fL      MCH 31.8 pg      MCHC 33.9 g/dL      RDW 14.7 %      RDW-SD 48.6 fl      MPV 9.8 fL      Platelets 292 10*3/mm3      Neutrophil % 52.6 %      Lymphocyte % 37.8 %      Monocyte % 5.2 %      Eosinophil % 3.5 %      Basophil % 0.9 %      Neutrophils, Absolute 4.30 10*3/mm3      Lymphocytes, Absolute 3.10 10*3/mm3      Monocytes, Absolute 0.40 10*3/mm3      Eosinophils, Absolute 0.30 10*3/mm3      Basophils, Absolute 0.10 10*3/mm3      nRBC 0.1 /100 WBC            Imaging Results (Most Recent)     Procedure Component Value Units Date/Time    XR Chest 1 View [[id number]] Collected:  02/09/20 1005     Updated:  02/09/20 1008    Narrative:       XR CHEST 1 VW-     Date of Exam: 2/9/2020 9:50 AM     Indication: pre op; W19.XXXA-Unspecified fall, initial encounter.     Comparison:  12/11/2018     Technique: Single view of the chest was obtained.     FINDINGS: The patient has undergone previous coronary bypass graft  surgery. The heart and pulmonary vessels are within normal limits. No  consolidative process or congestive change is noted. The mediastinum is  within normal limits. The bones show no focal abnormalities.       Impression:          1. Chronic postoperative changes noted about the chest. There is no  obvious pneumonia or congestive change        Electronically Signed By-Jeremiah Gonzalez On:2/9/2020 10:06 AM  This report was finalized on [id number] by  Jeremiah Gonzalez, .    XR Humerus Left [[id number]] Collected:  02/09/20 1005     Updated:  02/09/20 1007    Narrative:       DATE OF EXAM:  2/9/2020 9:50 AM     PROCEDURE:  XR HUMERUS LEFT-     INDICATIONS:  fall     COMPARISON:  No Comparisons Available     TECHNIQUE:   AP and lateral radiographic views were obtained of the left humerus.        FINDINGS:  There is a comminuted fracture involving the proximal third of the  humerus with moderate distraction and angulation of the fracture  fragments.        Impression:       Comminuted fracture  of the proximal third of the humerus is noted as  described above     Electronically Signed By-Jreemiah Gonzalez On:2/9/2020 10:05 AM  This report was finalized on [id number] by  Jeremiah Gonzalez, .             I reviewed the patient's new clinical results.  I reviewed the patient's new imaging results and agree with the interpretation.      Assessment/Plan       Fall      Clinical radiological findings were discussed with the patient in detail.  Patient has got long oblique fracture.  With the small splint combined mental fragment as well.  Treatment options were discussed in detail.  Felt she would benefit from nonoperative treatment with a segment of brace.  She is going to be applied today or tomorrow.  Patient is agreeable for the plan.  Once she is in the Obrien brace she can be discharged home or to a rehab facility.  We will follow her back in the clinic and see how she does with the brace.  The last option would be to go for surgical fixation.    I discussed the patient's findings and my recommendations with patient    Kennedy Uribe MD  02/09/20  10:17 PM

## 2020-02-11 NOTE — PROGRESS NOTES
Case Management Discharge Note      Final Note: home    Provided post acute provider list?: N/A       Final Discharge Disposition Code: 01 - home or self-care

## 2020-02-15 NOTE — DISCHARGE SUMMARY
Date of Discharge:  2/15/2020  Discharge prognosis guarded   Discharge place home   discharge Diagnosis:     Fall  Left comminuted displaced proximal humerus fracture  Hyperkalemia  Chronic coronary artery disease  Chronic kidney disease stage III  Panlobular emphysema  Nicotine abuse  Generalized anxiety disorder  Essential hypertension  Next hyperlipidemia  Benzodiazepine dependency      Presenting Problem/History of Present Illness  Active Hospital Problems    Diagnosis  POA   • Fall [W19.XXXA]  Yes      Resolved Hospital Problems   No resolved problems to display.        Hospital Course  Patient is a 70 y.o. female presented with an acute left comminuted displaced proximal humerus fracture.  The patient was seen by orthopedic surgery and a brace was placed.  She was deemed stable for discharge home for close outpatient follow-up.  No other complications with the patient's hospital stay.  The patient did request a secondary orthopedic consultation and will follow up with us within 1 week    Procedures Performed         Consults:   Consults     Date and Time Order Name Status Description    2/10/2020 1031 Inpatient Orthopedic Surgery Consult Completed     2/9/2020 1035 Family Medicine Consult Completed     2/9/2020 1010 Ortho (on-call MD unless specified) Completed           Pertinent Test Results:Xr Humerus Left    Result Date: 2/9/2020  Comminuted fracture of the proximal third of the humerus is noted as described above  Electronically Signed ByBahman Gonzalez On:2/9/2020 10:05 AM This report was finalized on [id number] by  Jeremiah Gonzalez .    Xr Chest 1 View    Result Date: 2/9/2020   1. Chronic postoperative changes noted about the chest. There is no obvious pneumonia or congestive change   Electronically Signed ByBahman Gonzalez On:2/9/2020 10:06 AM This report was finalized on [id number] by  Jeremiah Gonzalez, .      Imaging Results (Last 7 Days)     Procedure Component Value Units Date/Time    XR  Chest 1 View [[id number]] Collected:  02/09/20 1005     Updated:  02/09/20 1008    Narrative:       XR CHEST 1 VW-     Date of Exam: 2/9/2020 9:50 AM     Indication: pre op; W19.XXXA-Unspecified fall, initial encounter.     Comparison:  12/11/2018     Technique: Single view of the chest was obtained.     FINDINGS: The patient has undergone previous coronary bypass graft  surgery. The heart and pulmonary vessels are within normal limits. No  consolidative process or congestive change is noted. The mediastinum is  within normal limits. The bones show no focal abnormalities.       Impression:          1. Chronic postoperative changes noted about the chest. There is no  obvious pneumonia or congestive change        Electronically Signed ByBahman Gonzalez On:2/9/2020 10:06 AM  This report was finalized on [id number] by  Jeremiah Gonzalez, .    XR Humerus Left [[id number]] Collected:  02/09/20 1005     Updated:  02/09/20 1007    Narrative:       DATE OF EXAM:  2/9/2020 9:50 AM     PROCEDURE:  XR HUMERUS LEFT-     INDICATIONS:  fall     COMPARISON:  No Comparisons Available     TECHNIQUE:   AP and lateral radiographic views were obtained of the left humerus.        FINDINGS:  There is a comminuted fracture involving the proximal third of the  humerus with moderate distraction and angulation of the fracture  fragments.        Impression:       Comminuted fracture of the proximal third of the humerus is noted as  described above     Electronically Signed ByBahman Gonzalez On:2/9/2020 10:05 AM  This report was finalized on [id number] by  Jeremiah Gonzalez, .              Condition on Discharge:  Fair    Vital Signs       Physical Exam:     General Appearance:    Alert, cooperative, in no acute distress   Head:    Normocephalic, without obvious abnormality, atraumatic   Eyes:           Conjunctivae and sclerae normal, no   icterus, no pallor, corneas clear, PERRLA   Throat:   No oral lesions, no thrush, oral mucosa moist    Neck:   No adenopathy, supple, trachea midline, no thyromegaly, no   carotid bruit, no JVD   Lungs:     Clear to auscultation,respirations regular, even and                  unlabored    Heart:    Regular rhythm and normal rate, normal S1 and S2, no            murmur, no gallop, no rub, no click   Chest Wall:    No abnormalities observed   Abdomen:     Normal bowel sounds, no masses, no organomegaly, soft        non-tender, non-distended, no guarding, no rebound                tenderness   Rectal:     Deferred   Extremities:   Moves all extremities well, no edema, no cyanosis, no             redness   Pulses:   Pulses palpable and equal bilaterally   Skin:   No bleeding, bruising or rash   Lymph nodes:   No palpable adenopathy   Neurologic:   Cranial nerves 2 - 12 grossly intact, sensation intact, DTR       present and equal bilaterally         Discharge Disposition  Home or Self Care    Discharge Medications     Discharge Medications      New Medications      Instructions Start Date   NORCO  MG per tablet  Generic drug:  HYDROcodone-acetaminophen   1 tablet, Oral, Every 6 Hours PRN         Continue These Medications      Instructions Start Date   ALPRAZolam 1 MG tablet  Commonly known as:  XANAX  Notes to patient:  Do not take medication with pain medication.   1 mg, Oral, 4 Times Daily      atorvastatin 40 MG tablet  Commonly known as:  LIPITOR   40 mg, Oral, Daily      busPIRone 10 MG tablet  Commonly known as:  BUSPAR   10 mg, Oral, 2 Times Daily      calcium carbonate 500 MG chewable tablet  Commonly known as:  TUMS   1 tablet, Oral, 3 Times Daily PRN      furosemide 20 MG tablet  Commonly known as:  LASIX   20 mg, Oral, 2 Times Weekly, Monday and Friday      ipratropium-albuterol 0.5-2.5 mg/3 ml nebulizer  Commonly known as:  DUO-NEB   3 mL, Nebulization, 4 Times Daily - RT      metFORMIN 500 MG tablet  Commonly known as:  GLUCOPHAGE   500 mg, Oral, 2 Times Daily With Meals      nateglinide 120 MG  tablet  Commonly known as:  STARLIX   120 mg, Oral, 2 Times Daily         Stop These Medications    aspirin 81 MG tablet            Discharge Diet:   Diet Instructions     Diabetic diet               Activity at Discharge:   Activity Instructions     Activity as tolerated                Follow-up Appointments  Follow-up with us in 1 week  Follow-up with orthopedic surgery in 6 weeks  Future Appointments   Date Time Provider Department Center   4/1/2020 10:20 AM Ernst Hurtado MD [facility]         Test Results Pending at Discharge       Marky Ritchie MD  02/15/20  10:26 AM

## 2020-03-13 ENCOUNTER — TRANSCRIBE ORDERS (OUTPATIENT)
Dept: LAB | Facility: HOSPITAL | Age: 70
End: 2020-03-13

## 2020-03-13 ENCOUNTER — LAB (OUTPATIENT)
Dept: LAB | Facility: HOSPITAL | Age: 70
End: 2020-03-13

## 2020-03-13 DIAGNOSIS — I10 ESSENTIAL HYPERTENSION, MALIGNANT: ICD-10-CM

## 2020-03-13 DIAGNOSIS — E55.9 VITAMIN D DEFICIENCY, UNSPECIFIED: ICD-10-CM

## 2020-03-13 DIAGNOSIS — N18.30 CHRONIC KIDNEY DISEASE, STAGE III (MODERATE) (HCC): Primary | ICD-10-CM

## 2020-03-13 DIAGNOSIS — N18.30 CHRONIC KIDNEY DISEASE, STAGE III (MODERATE) (HCC): ICD-10-CM

## 2020-03-13 LAB
25(OH)D3 SERPL-MCNC: 24.3 NG/ML (ref 30–100)
ANION GAP SERPL CALCULATED.3IONS-SCNC: 12.4 MMOL/L (ref 5–15)
BACTERIA UR QL AUTO: NORMAL /HPF
BASOPHILS # BLD AUTO: 0.05 10*3/MM3 (ref 0–0.2)
BASOPHILS NFR BLD AUTO: 0.8 % (ref 0–1.5)
BILIRUB UR QL STRIP: NEGATIVE
BUN BLD-MCNC: 21 MG/DL (ref 8–23)
BUN/CREAT SERPL: 17.1 (ref 7–25)
CALCIUM SPEC-SCNC: 9.3 MG/DL (ref 8.6–10.5)
CHLORIDE SERPL-SCNC: 99 MMOL/L (ref 98–107)
CLARITY UR: CLEAR
CO2 SERPL-SCNC: 26.6 MMOL/L (ref 22–29)
COLOR UR: YELLOW
CREAT BLD-MCNC: 1.23 MG/DL (ref 0.57–1)
CREAT UR-MCNC: 30.8 MG/DL
DEPRECATED RDW RBC AUTO: 44.4 FL (ref 37–54)
EOSINOPHIL # BLD AUTO: 0.17 10*3/MM3 (ref 0–0.4)
EOSINOPHIL NFR BLD AUTO: 2.8 % (ref 0.3–6.2)
ERYTHROCYTE [DISTWIDTH] IN BLOOD BY AUTOMATED COUNT: 12.8 % (ref 12.3–15.4)
GFR SERPL CREATININE-BSD FRML MDRD: 43 ML/MIN/1.73
GLUCOSE BLD-MCNC: 203 MG/DL (ref 65–99)
GLUCOSE UR STRIP-MCNC: NEGATIVE MG/DL
HCT VFR BLD AUTO: 40.9 % (ref 34–46.6)
HGB BLD-MCNC: 13.5 G/DL (ref 12–15.9)
HGB UR QL STRIP.AUTO: NEGATIVE
HYALINE CASTS UR QL AUTO: NORMAL /LPF
IMM GRANULOCYTES # BLD AUTO: 0.02 10*3/MM3 (ref 0–0.05)
IMM GRANULOCYTES NFR BLD AUTO: 0.3 % (ref 0–0.5)
KETONES UR QL STRIP: NEGATIVE
LEUKOCYTE ESTERASE UR QL STRIP.AUTO: NEGATIVE
LYMPHOCYTES # BLD AUTO: 2.26 10*3/MM3 (ref 0.7–3.1)
LYMPHOCYTES NFR BLD AUTO: 36.9 % (ref 19.6–45.3)
MCH RBC QN AUTO: 31 PG (ref 26.6–33)
MCHC RBC AUTO-ENTMCNC: 33 G/DL (ref 31.5–35.7)
MCV RBC AUTO: 94 FL (ref 79–97)
MONOCYTES # BLD AUTO: 0.33 10*3/MM3 (ref 0.1–0.9)
MONOCYTES NFR BLD AUTO: 5.4 % (ref 5–12)
NEUTROPHILS # BLD AUTO: 3.3 10*3/MM3 (ref 1.7–7)
NEUTROPHILS NFR BLD AUTO: 53.8 % (ref 42.7–76)
NITRITE UR QL STRIP: NEGATIVE
NRBC BLD AUTO-RTO: 0.2 /100 WBC (ref 0–0.2)
PH UR STRIP.AUTO: 6 [PH] (ref 5–8)
PHOSPHATE SERPL-MCNC: 4.1 MG/DL (ref 2.5–4.5)
PLATELET # BLD AUTO: 274 10*3/MM3 (ref 140–450)
PMV BLD AUTO: 11.5 FL (ref 6–12)
POTASSIUM BLD-SCNC: 5 MMOL/L (ref 3.5–5.2)
PROT UR QL STRIP: ABNORMAL
PROT UR-MCNC: 33 MG/DL
PROT/CREAT UR: 1071.4 MG/G CREA (ref 0–200)
PTH-INTACT SERPL-MCNC: 51.8 PG/ML (ref 15–65)
RBC # BLD AUTO: 4.35 10*6/MM3 (ref 3.77–5.28)
RBC # UR: NORMAL /HPF
REF LAB TEST METHOD: NORMAL
SODIUM BLD-SCNC: 138 MMOL/L (ref 136–145)
SP GR UR STRIP: 1.01 (ref 1–1.03)
SQUAMOUS #/AREA URNS HPF: NORMAL /HPF
UROBILINOGEN UR QL STRIP: ABNORMAL
WBC NRBC COR # BLD: 6.13 10*3/MM3 (ref 3.4–10.8)
WBC UR QL AUTO: NORMAL /HPF

## 2020-03-13 PROCEDURE — 84156 ASSAY OF PROTEIN URINE: CPT

## 2020-03-13 PROCEDURE — 83970 ASSAY OF PARATHORMONE: CPT

## 2020-03-13 PROCEDURE — 82570 ASSAY OF URINE CREATININE: CPT

## 2020-03-13 PROCEDURE — 36415 COLL VENOUS BLD VENIPUNCTURE: CPT

## 2020-03-13 PROCEDURE — 80048 BASIC METABOLIC PNL TOTAL CA: CPT

## 2020-03-13 PROCEDURE — 82306 VITAMIN D 25 HYDROXY: CPT

## 2020-03-13 PROCEDURE — 84100 ASSAY OF PHOSPHORUS: CPT

## 2020-03-13 PROCEDURE — 85025 COMPLETE CBC W/AUTO DIFF WBC: CPT

## 2020-03-13 PROCEDURE — 81001 URINALYSIS AUTO W/SCOPE: CPT

## 2020-06-02 RX ORDER — OXYCODONE HYDROCHLORIDE AND ACETAMINOPHEN 5; 325 MG/1; MG/1
TABLET ORAL
COMMUNITY
Start: 2020-03-04 | End: 2020-06-04

## 2020-06-02 RX ORDER — LOSARTAN POTASSIUM 50 MG/1
50 TABLET ORAL DAILY
COMMUNITY
Start: 2020-03-05

## 2020-06-04 ENCOUNTER — OFFICE VISIT (OUTPATIENT)
Dept: CARDIOLOGY | Facility: CLINIC | Age: 70
End: 2020-06-04

## 2020-06-04 VITALS
HEART RATE: 87 BPM | SYSTOLIC BLOOD PRESSURE: 174 MMHG | DIASTOLIC BLOOD PRESSURE: 82 MMHG | HEIGHT: 67 IN | BODY MASS INDEX: 30.13 KG/M2 | OXYGEN SATURATION: 96 % | WEIGHT: 192 LBS

## 2020-06-04 DIAGNOSIS — E11.9 TYPE 2 DIABETES MELLITUS WITHOUT COMPLICATION, WITHOUT LONG-TERM CURRENT USE OF INSULIN (HCC): ICD-10-CM

## 2020-06-04 DIAGNOSIS — J44.9 CHRONIC OBSTRUCTIVE PULMONARY DISEASE, UNSPECIFIED COPD TYPE (HCC): ICD-10-CM

## 2020-06-04 DIAGNOSIS — I25.118 CORONARY ARTERY DISEASE OF NATIVE ARTERY OF NATIVE HEART WITH STABLE ANGINA PECTORIS (HCC): Primary | ICD-10-CM

## 2020-06-04 DIAGNOSIS — E78.00 PURE HYPERCHOLESTEROLEMIA: ICD-10-CM

## 2020-06-04 DIAGNOSIS — I10 ESSENTIAL HYPERTENSION: ICD-10-CM

## 2020-06-04 PROBLEM — E78.5 DYSLIPIDEMIA: Status: ACTIVE | Noted: 2018-10-01

## 2020-06-04 PROBLEM — I25.10 CORONARY ARTERY DISEASE: Status: ACTIVE | Noted: 2018-10-01

## 2020-06-04 PROCEDURE — 99214 OFFICE O/P EST MOD 30 MIN: CPT | Performed by: INTERNAL MEDICINE

## 2020-06-04 NOTE — PROGRESS NOTES
Subjective:     Encounter Date:06/04/2020      Patient ID: Kathy Wu is a 70 y.o. female.    Chief Complaint:  History of Present Illness 70-year-old white female with history of coronary artery disease status post coronary artery bypass surgery history of diabetes hypertension hyperlipidemia COPD presents to my office for follow-up.  Patient is currently stable without evidence of chest pain or shortness of breath at rest but has some shortness of breath with exertion.  No complains of any PND orthopnea.  No palpitation dizziness syncope or swelling of the feet.  Patient has been taking all the medicines regularly.  She is trying to exercise regularly.  She does not smoke.  She follows a good diet.    The following portions of the patient's history were reviewed and updated as appropriate: allergies, current medications, past family history, past medical history, past social history, past surgical history and problem list.  Past Medical History:   Diagnosis Date   • 3-vessel CAD 09/06/2018    Severe on Cardiac Cath; Daily Aspirin--S/p CABG   • Abnormal EKG 09/04/2018   • Anxiety     Xanax   • Breast pain, left 09/06/2018    Per Pt   • Cataract    • Chest pain 09/2018   • Cholecystitis 07/2007    S/p Cholecystectomy   • Cholecystolithiasis 07/2007    S/p Cholecystectomy   • Colitis 2007   • COPD (chronic obstructive pulmonary disease) (CMS/[facility])    • Diverticulosis 2007   • DM (diabetes mellitus) (CMS/[facility]) Since 1948    T2--Metformin Currently   • Dyslipidemia     Controlled w/Meds   • Family history of heart disease     Mother & Father Both Had MI's   • Gastritis 2007   • History of bone density [facility] 07/31/2008-[facility]    NL   • Hx of chest x-ray 09/4/18-[facility]    WNL w/Calcified Atherosclerotic Disease in Thoracic Aorta   • Hx of CT scan of chest 09/4/18-[facility]    Normal Findings with Degenerative Changes of Spine; No Evidence of Pulmonary Embolous Seen; 2.1CM Thyroid Nodule Noted   • Hypertension  "Since 194    Controlled w/Meds   • Kidney disease, chronic, stage III (GFR 30-59 ml/min) (CMS/[facility])    • Nasal congestion 2016    Treated @ [facility] w/Antibiotics   • Postmenopausal    • Renal disease    • Ringing of ears, bilateral    • SOB (shortness of breath)     Chronic   • Stress    • Tobacco use     1 PPD     Past Surgical History:   Procedure Laterality Date   • BREAST SURGERY Right     biopsy   • CARDIAC CATHETERIZATION  18-[facility]    Dr. Hurtado--Severe L Main 3-V CAD   • CARDIAC SURGERY     • CHOLECYSTECTOMY  2007    Lap--Dusty Hernandez MD   • COLONOSCOPY     • COLONOSCOPY N/A 2019    Procedure: COLONOSCOPY with polypectomy X2;  Surgeon: Jazz More MD;  Location: [facility] ENDOSCOPY;  Service: Gastroenterology   • CORONARY ARTERY BYPASS GRAFT  18-[facility]    Urgent x 4 with Left Saphenous Vein Grafting-Dr. Palmer   • ENDOSCOPIC VEIN HARVEST  18-[facility]    Of R Lower Extremity-Dr. Palmer   • ENDOSCOPY  07-[facility]    w/EGD-Erich Wasserman MD   • HAND SURGERY     • LAPAROSCOPIC TUBAL LIGATION       /82 (BP Location: Left arm, Patient Position: Sitting)   Pulse 87   Ht 168.9 cm (66.5\")   Wt 87.1 kg (192 lb)   SpO2 96%   BMI 30.53 kg/m²   Family History   Problem Relation Age of Onset   • Heart attack Mother    • Coronary artery disease Mother         Had CABG   • Heart attack Father          Age 63       Current Outpatient Medications:   •  ALPRAZolam (XANAX) 1 MG tablet, Take 1 mg by mouth 4 (Four) Times a Day., Disp: , Rfl:   •  aspirin 325 MG EC tablet, Take 325 mg by mouth Daily., Disp: , Rfl:   •  atorvastatin (LIPITOR) 40 MG tablet, Take 40 mg by mouth Daily., Disp: , Rfl:   •  busPIRone (BUSPAR) 10 MG tablet, Take 10 mg by mouth 2 (Two) Times a Day., Disp: , Rfl:   •  calcium carbonate (TUMS) 500 MG chewable tablet, Chew 1 tablet 3 (Three) Times a Day As Needed for Indigestion or Heartburn., Disp: , Rfl:   •  furosemide (LASIX) 20 MG tablet, Take 20 mg by " mouth 2 (Two) Times a Week. Monday and Friday, Disp: , Rfl:   •  ipratropium-albuterol (DUO-NEB) 0.5-2.5 mg/3 ml nebulizer, Take 3 mL by nebulization 4 (Four) Times a Day., Disp: , Rfl:   •  losartan (COZAAR) 25 MG tablet, Take  by mouth Daily., Disp: , Rfl:   •  metFORMIN (GLUCOPHAGE) 500 MG tablet, Take 500 mg by mouth 2 (Two) Times a Day With Meals., Disp: , Rfl:   •  nateglinide (STARLIX) 120 MG tablet, Take 120 mg by mouth 2 (Two) Times a Day., Disp: , Rfl:   Allergies   Allergen Reactions   • Codeine Unknown (See Comments)     Unknown     Social History     Socioeconomic History   • Marital status:      Spouse name: Don   • Number of children: Not on file   • Years of education: Not on file   • Highest education level: Not on file   Occupational History   • Occupation: UNEMPLOYED   Tobacco Use   • Smoking status: Former Smoker     Packs/day: 1.00     Types: Cigarettes     Last attempt to quit: 2018     Years since quittin.7   • Smokeless tobacco: Never Used   Substance and Sexual Activity   • Alcohol use: No   • Drug use: No   • Sexual activity: Defer     Birth control/protection: Post-menopausal, Surgical     Comment: TL     Review of Systems   Constitution: Negative for fever and malaise/fatigue.   HENT: Negative for ear pain and nosebleeds.    Eyes: Negative for blurred vision and double vision.   Cardiovascular: Negative for chest pain, dyspnea on exertion and palpitations.   Respiratory: Positive for shortness of breath. Negative for cough.    Skin: Negative for rash.   Musculoskeletal: Negative for joint pain.   Gastrointestinal: Negative for abdominal pain, nausea and vomiting.   Neurological: Negative for focal weakness and headaches.   Psychiatric/Behavioral: Negative for depression. The patient is not nervous/anxious.    All other systems reviewed and are negative.             Objective:     Physical Exam   Constitutional: She appears well-developed and well-nourished.   HENT:   Head:  Normocephalic and atraumatic.   Eyes: Pupils are equal, round, and reactive to light. Conjunctivae and EOM are normal. No scleral icterus.   Neck: Normal range of motion. Neck supple. No JVD present. Carotid bruit is not present.   Cardiovascular: Normal rate, regular rhythm, S1 normal, S2 normal, normal heart sounds and intact distal pulses. PMI is not displaced.   Pulmonary/Chest: Effort normal and breath sounds normal. She has no wheezes. She has no rales.   Abdominal: Soft. Bowel sounds are normal.   Musculoskeletal: Normal range of motion.   Neurological: She is alert. She has normal strength.   No focal deficits   Skin: Skin is warm and dry. No rash noted.   Psychiatric: She has a normal mood and affect.     Procedures    Lab Review:       Assessment:          Diagnosis Plan   1. Coronary artery disease of native artery of native heart with stable angina pectoris (CMS/[facility])     2. Essential hypertension     3. Pure hypercholesterolemia     4. Type 2 diabetes mellitus without complication, without long-term current use of insulin (CMS/[facility])     5. Chronic obstructive pulmonary disease, unspecified COPD type (CMS/[facility])            Plan:       Patient has history of coronary artery disease status post coronary bypass surgery x4 vessels with a LIMA to LAD and saphenous graft to the diagonal branch marginal branch and RCA  Patient has normal function  Patient blood pressure and heart rate are stable  Patient lipid levels are followed by the primary care doctor  Patient has diabetes and is on oral medicines and is followed by the primary care doctor  Patient has COPD is not smoking anymore but has significant passive smoking from the   Discussed with both  and wife about smoking cessation  Continue current medicines and follow her in 6 months

## 2020-08-13 NOTE — PLAN OF CARE
Pt new admit to floor. Left arm in splint, ortho to see this evening. Complains of pain to LUE. Calm and cooperative with care, family at bedside.    13-Aug-2020 10:06

## 2020-10-05 ENCOUNTER — TRANSCRIBE ORDERS (OUTPATIENT)
Dept: LAB | Facility: HOSPITAL | Age: 70
End: 2020-10-05

## 2020-10-05 ENCOUNTER — LAB (OUTPATIENT)
Dept: LAB | Facility: HOSPITAL | Age: 70
End: 2020-10-05

## 2020-10-05 DIAGNOSIS — N18.30 MALIGNANT HYPERTENSIVE HEART AND CHRONIC KIDNEY DISEASE STAGE III (HCC): ICD-10-CM

## 2020-10-05 DIAGNOSIS — R80.9 PROTEINURIA, UNSPECIFIED TYPE: ICD-10-CM

## 2020-10-05 DIAGNOSIS — I10 HYPERTENSION, UNSPECIFIED TYPE: ICD-10-CM

## 2020-10-05 DIAGNOSIS — I13.10 MALIGNANT HYPERTENSIVE HEART AND CHRONIC KIDNEY DISEASE STAGE III (HCC): ICD-10-CM

## 2020-10-05 DIAGNOSIS — D63.1 ANEMIA IN END-STAGE RENAL DISEASE (HCC): ICD-10-CM

## 2020-10-05 DIAGNOSIS — E55.9 VITAMIN D INSUFFICIENCY: ICD-10-CM

## 2020-10-05 DIAGNOSIS — N18.6 ANEMIA IN END-STAGE RENAL DISEASE (HCC): Primary | ICD-10-CM

## 2020-10-05 DIAGNOSIS — D63.1 ANEMIA IN END-STAGE RENAL DISEASE (HCC): Primary | ICD-10-CM

## 2020-10-05 DIAGNOSIS — N18.6 ANEMIA IN END-STAGE RENAL DISEASE (HCC): ICD-10-CM

## 2020-10-05 LAB
25(OH)D3 SERPL-MCNC: 32.8 NG/ML (ref 30–100)
ANION GAP SERPL CALCULATED.3IONS-SCNC: 9.5 MMOL/L (ref 5–15)
BACTERIA UR QL AUTO: NORMAL /HPF
BASOPHILS # BLD AUTO: 0.06 10*3/MM3 (ref 0–0.2)
BASOPHILS NFR BLD AUTO: 0.8 % (ref 0–1.5)
BILIRUB UR QL STRIP: NEGATIVE
BUN SERPL-MCNC: 24 MG/DL (ref 8–23)
BUN/CREAT SERPL: 18.6 (ref 7–25)
CALCIUM SPEC-SCNC: 9 MG/DL (ref 8.6–10.5)
CHLORIDE SERPL-SCNC: 104 MMOL/L (ref 98–107)
CLARITY UR: CLEAR
CO2 SERPL-SCNC: 25.5 MMOL/L (ref 22–29)
COLOR UR: ABNORMAL
CREAT SERPL-MCNC: 1.29 MG/DL (ref 0.57–1)
CREAT UR-MCNC: 15.8 MG/DL
DEPRECATED RDW RBC AUTO: 45.4 FL (ref 37–54)
EOSINOPHIL # BLD AUTO: 0.24 10*3/MM3 (ref 0–0.4)
EOSINOPHIL NFR BLD AUTO: 3.2 % (ref 0.3–6.2)
ERYTHROCYTE [DISTWIDTH] IN BLOOD BY AUTOMATED COUNT: 12.7 % (ref 12.3–15.4)
GFR SERPL CREATININE-BSD FRML MDRD: 41 ML/MIN/1.73
GLUCOSE SERPL-MCNC: 220 MG/DL (ref 65–99)
GLUCOSE UR STRIP-MCNC: ABNORMAL MG/DL
HBA1C MFR BLD: 6.6 % (ref 3.5–5.6)
HCT VFR BLD AUTO: 41.1 % (ref 34–46.6)
HGB BLD-MCNC: 13.4 G/DL (ref 12–15.9)
HGB UR QL STRIP.AUTO: ABNORMAL
HYALINE CASTS UR QL AUTO: NORMAL /LPF
IMM GRANULOCYTES # BLD AUTO: 0.03 10*3/MM3 (ref 0–0.05)
IMM GRANULOCYTES NFR BLD AUTO: 0.4 % (ref 0–0.5)
KETONES UR QL STRIP: NEGATIVE
LEUKOCYTE ESTERASE UR QL STRIP.AUTO: NEGATIVE
LYMPHOCYTES # BLD AUTO: 2.77 10*3/MM3 (ref 0.7–3.1)
LYMPHOCYTES NFR BLD AUTO: 36.7 % (ref 19.6–45.3)
MAGNESIUM SERPL-MCNC: 1.5 MG/DL (ref 1.6–2.4)
MCH RBC QN AUTO: 31.8 PG (ref 26.6–33)
MCHC RBC AUTO-ENTMCNC: 32.6 G/DL (ref 31.5–35.7)
MCV RBC AUTO: 97.4 FL (ref 79–97)
MONOCYTES # BLD AUTO: 0.43 10*3/MM3 (ref 0.1–0.9)
MONOCYTES NFR BLD AUTO: 5.7 % (ref 5–12)
NEUTROPHILS NFR BLD AUTO: 4.01 10*3/MM3 (ref 1.7–7)
NEUTROPHILS NFR BLD AUTO: 53.2 % (ref 42.7–76)
NITRITE UR QL STRIP: NEGATIVE
NRBC BLD AUTO-RTO: 0 /100 WBC (ref 0–0.2)
PH UR STRIP.AUTO: 6 [PH] (ref 5–8)
PLATELET # BLD AUTO: 240 10*3/MM3 (ref 140–450)
PMV BLD AUTO: 11.7 FL (ref 6–12)
POTASSIUM SERPL-SCNC: 5 MMOL/L (ref 3.5–5.2)
PROT UR QL STRIP: ABNORMAL
PROT UR-MCNC: 26 MG/DL
PROT/CREAT UR: 1645.6 MG/G CREA (ref 0–200)
PTH-INTACT SERPL-MCNC: 48.1 PG/ML (ref 15–65)
RBC # BLD AUTO: 4.22 10*6/MM3 (ref 3.77–5.28)
RBC # UR: NORMAL /HPF
REF LAB TEST METHOD: NORMAL
SODIUM SERPL-SCNC: 139 MMOL/L (ref 136–145)
SP GR UR STRIP: 1.01 (ref 1–1.03)
SQUAMOUS #/AREA URNS HPF: NORMAL /HPF
URATE SERPL-MCNC: 4.5 MG/DL (ref 2.4–5.7)
UROBILINOGEN UR QL STRIP: ABNORMAL
WBC # BLD AUTO: 7.54 10*3/MM3 (ref 3.4–10.8)
WBC UR QL AUTO: NORMAL /HPF

## 2020-10-05 PROCEDURE — 83036 HEMOGLOBIN GLYCOSYLATED A1C: CPT

## 2020-10-05 PROCEDURE — 83970 ASSAY OF PARATHORMONE: CPT

## 2020-10-05 PROCEDURE — 83735 ASSAY OF MAGNESIUM: CPT

## 2020-10-05 PROCEDURE — 84156 ASSAY OF PROTEIN URINE: CPT

## 2020-10-05 PROCEDURE — 84550 ASSAY OF BLOOD/URIC ACID: CPT

## 2020-10-05 PROCEDURE — 82306 VITAMIN D 25 HYDROXY: CPT

## 2020-10-05 PROCEDURE — 36415 COLL VENOUS BLD VENIPUNCTURE: CPT

## 2020-10-05 PROCEDURE — 85025 COMPLETE CBC W/AUTO DIFF WBC: CPT

## 2020-10-05 PROCEDURE — 80048 BASIC METABOLIC PNL TOTAL CA: CPT

## 2020-10-05 PROCEDURE — 82570 ASSAY OF URINE CREATININE: CPT

## 2020-10-05 PROCEDURE — 81001 URINALYSIS AUTO W/SCOPE: CPT

## 2021-02-17 ENCOUNTER — OFFICE VISIT (OUTPATIENT)
Dept: CARDIOLOGY | Facility: CLINIC | Age: 71
End: 2021-02-17

## 2021-02-17 VITALS
BODY MASS INDEX: 28.88 KG/M2 | TEMPERATURE: 96.9 F | SYSTOLIC BLOOD PRESSURE: 160 MMHG | OXYGEN SATURATION: 98 % | DIASTOLIC BLOOD PRESSURE: 83 MMHG | WEIGHT: 184 LBS | HEART RATE: 86 BPM | HEIGHT: 67 IN

## 2021-02-17 DIAGNOSIS — E78.00 PURE HYPERCHOLESTEROLEMIA: ICD-10-CM

## 2021-02-17 DIAGNOSIS — J44.9 CHRONIC OBSTRUCTIVE PULMONARY DISEASE, UNSPECIFIED COPD TYPE (HCC): ICD-10-CM

## 2021-02-17 DIAGNOSIS — E11.9 TYPE 2 DIABETES MELLITUS WITHOUT COMPLICATION, WITHOUT LONG-TERM CURRENT USE OF INSULIN (HCC): ICD-10-CM

## 2021-02-17 DIAGNOSIS — I10 ESSENTIAL HYPERTENSION: ICD-10-CM

## 2021-02-17 DIAGNOSIS — I25.118 CORONARY ARTERY DISEASE OF NATIVE ARTERY OF NATIVE HEART WITH STABLE ANGINA PECTORIS (HCC): Primary | ICD-10-CM

## 2021-02-17 PROCEDURE — 99214 OFFICE O/P EST MOD 30 MIN: CPT | Performed by: INTERNAL MEDICINE

## 2021-02-17 RX ORDER — ERGOCALCIFEROL 1.25 MG/1
50000 CAPSULE ORAL WEEKLY
COMMUNITY

## 2021-02-17 RX ORDER — CHLORAL HYDRATE 500 MG
CAPSULE ORAL 2 TIMES DAILY WITH MEALS
COMMUNITY

## 2021-02-17 NOTE — PROGRESS NOTES
Subjective:     Encounter Date:02/17/2021      Patient ID: Kathy Wu is a 71 y.o. female.    Chief Complaint:  History of Present Illness 71-year-old white female with history of coronary disease status post coronary bypass surgery history of diabetes hypertension hyperlipidemia and COPD presents to my office for follow-up.  Patient is currently stable without any signs of chest pain or shortness of breath at rest or exertion.  No complains of any PND orthopnea.  No palpitation dizziness syncope or swelling of the feet.  She still continues to smoke.  She is trying to be active.    The following portions of the patient's history were reviewed and updated as appropriate: allergies, current medications, past family history, past medical history, past social history, past surgical history and problem list.  Past Medical History:   Diagnosis Date   • 3-vessel CAD 09/06/2018    Severe on Cardiac Cath; Daily Aspirin--S/p CABG   • Abnormal EKG 09/04/2018   • Anxiety     Xanax   • Breast pain, left 09/06/2018    Per Pt   • Cataract    • Chest pain 09/2018   • Cholecystitis 07/2007    S/p Cholecystectomy   • Cholecystolithiasis 07/2007    S/p Cholecystectomy   • Colitis 2007   • COPD (chronic obstructive pulmonary disease) (CMS/[facility])    • Diverticulosis 2007   • DM (diabetes mellitus) (CMS/[facility]) Since 1948    T2--Metformin Currently   • Dyslipidemia     Controlled w/Meds   • Family history of heart disease     Mother & Father Both Had MI's   • Gastritis 2007   • History of bone density study 07/31/2008-[facility]    NL   • Hx of chest x-ray 09/4/18-[facility]    WNL w/Calcified Atherosclerotic Disease in Thoracic Aorta   • Hx of CT scan of chest 09/4/18-[facility]    Normal Findings with Degenerative Changes of Spine; No Evidence of Pulmonary Embolous Seen; 2.1CM Thyroid Nodule Noted   • Hypertension Since 1948    Controlled w/Meds   • Kidney disease, chronic, stage III (GFR 30-59 ml/min) (CMS/[facility])    • Nasal congestion  "2016    Treated @ [facility] w/Antibiotics   • Postmenopausal    • Renal disease    • Ringing of ears, bilateral    • SOB (shortness of breath)     Chronic   • Stress    • Tobacco use     1 PPD     Past Surgical History:   Procedure Laterality Date   • BREAST SURGERY Right     biopsy   • CARDIAC CATHETERIZATION  18-[facility]    Dr. Hurtado--Severe L Main 3-V CAD   • CARDIAC SURGERY     • CHOLECYSTECTOMY  2007    Lap--Dusty Hernandez MD   • COLONOSCOPY     • COLONOSCOPY N/A 2019    Procedure: COLONOSCOPY with polypectomy X2;  Surgeon: Jazz More MD;  Location: [facility] ENDOSCOPY;  Service: Gastroenterology   • CORONARY ARTERY BYPASS GRAFT  18-[facility]    Urgent x 4 with Left Saphenous Vein Grafting-Dr. Palmer   • ENDOSCOPIC VEIN HARVEST  18-[facility]    Of R Lower Extremity-Dr. Palmer   • ENDOSCOPY  07-[facility]    w/EGD-Erich Wasserman MD   • HAND SURGERY     • LAPAROSCOPIC TUBAL LIGATION       /83   Pulse 86   Temp 96.9 °F (36.1 °C)   Ht 168.9 cm (66.5\")   Wt 83.5 kg (184 lb)   SpO2 98%   BMI 29.25 kg/m²   Family History   Problem Relation Age of Onset   • Heart attack Mother    • Coronary artery disease Mother         Had CABG   • Heart attack Father          Age 63       Current Outpatient Medications:   •  ALPRAZolam (XANAX) 1 MG tablet, Take 1 mg by mouth 4 (Four) Times a Day., Disp: , Rfl:   •  aspirin 325 MG EC tablet, Take 325 mg by mouth Daily., Disp: , Rfl:   •  atorvastatin (LIPITOR) 40 MG tablet, Take 40 mg by mouth Daily., Disp: , Rfl:   •  busPIRone (BUSPAR) 10 MG tablet, Take 10 mg by mouth 2 (Two) Times a Day., Disp: , Rfl:   •  furosemide (LASIX) 20 MG tablet, Take 20 mg by mouth 2 (Two) Times a Week. Monday and Friday, Disp: , Rfl:   •  ipratropium-albuterol (DUO-NEB) 0.5-2.5 mg/3 ml nebulizer, Take 3 mL by nebulization 4 (Four) Times a Day., Disp: , Rfl:   •  losartan (COZAAR) 25 MG tablet, Take 50 mg by mouth Daily., Disp: , Rfl:   •  magnesium oxide (MAGOX) " 400 (241.3 Mg) MG tablet tablet, Take 400 mg by mouth Daily., Disp: , Rfl:   •  metFORMIN (GLUCOPHAGE) 500 MG tablet, Take 500 mg by mouth 2 (Two) Times a Day With Meals., Disp: , Rfl:   •  nateglinide (STARLIX) 120 MG tablet, Take 120 mg by mouth 2 (Two) Times a Day., Disp: , Rfl:   •  Omega-3 Fatty Acids (fish oil) 1000 MG capsule capsule, Take  by mouth 2 (Two) Times a Day With Meals., Disp: , Rfl:   •  vitamin D (ERGOCALCIFEROL) 1.25 MG (84195 UT) capsule capsule, Take 50,000 Units by mouth 1 (One) Time Per Week., Disp: , Rfl:   •  calcium carbonate (TUMS) 500 MG chewable tablet, Chew 1 tablet 3 (Three) Times a Day As Needed for Indigestion or Heartburn., Disp: , Rfl:   Allergies   Allergen Reactions   • Codeine Unknown (See Comments)     Unknown     Social History     Socioeconomic History   • Marital status:      Spouse name: Don   • Number of children: Not on file   • Years of education: Not on file   • Highest education level: Not on file   Occupational History   • Occupation: UNEMPLOYED   Tobacco Use   • Smoking status: Current Every Day Smoker     Packs/day: 1.00     Types: Cigarettes     Last attempt to quit: 2018     Years since quittin.4   • Smokeless tobacco: Never Used   Substance and Sexual Activity   • Alcohol use: No   • Drug use: No   • Sexual activity: Defer     Birth control/protection: Post-menopausal, Surgical     Comment: TL     Review of Systems   Constitution: Negative for fever and malaise/fatigue.   Cardiovascular: Negative for chest pain, dyspnea on exertion, leg swelling and palpitations.   Respiratory: Negative for cough and shortness of breath.    Skin: Negative for rash.   Gastrointestinal: Negative for abdominal pain, nausea and vomiting.   Neurological: Negative for dizziness, focal weakness, headaches, light-headedness and numbness.   All other systems reviewed and are negative.             Objective:     Constitutional:       Appearance: Well-developed.   Eyes:       General: No scleral icterus.     Conjunctiva/sclera: Conjunctivae normal.   HENT:      Head: Normocephalic and atraumatic.   Neck:      Musculoskeletal: Normal range of motion and neck supple.      Vascular: No carotid bruit or JVD.   Pulmonary:      Effort: Pulmonary effort is normal.      Breath sounds: Normal breath sounds. No wheezing. No rales.   Cardiovascular:      Normal rate. Regular rhythm.   Pulses:     Intact distal pulses.   Abdominal:      General: Bowel sounds are normal.      Palpations: Abdomen is soft.   Skin:     General: Skin is warm and dry.      Findings: No rash.   Neurological:      Mental Status: Alert.       Procedures    Lab Review:         MDM  1.  Coronary disease  Patient had history of coronary disease respiratory bypass surgery x3 vessels with a LIMA to LAD and saphenous graft to the marginal branch and RCA  Patient has normal be systolic function  2.  Hypertension  Patient systolic blood pressure slightly high but have discussed with her about watching the blood pressure at home  3.  Hyperlipidemia  Patient lipid levels are followed by the primary care doctor  4.  History of diabetes  Patient has diabetes and is on oral medicines  5.  COPD  Patient is advised to stop smoking.

## 2021-04-05 ENCOUNTER — TRANSCRIBE ORDERS (OUTPATIENT)
Dept: LAB | Facility: HOSPITAL | Age: 71
End: 2021-04-05

## 2021-04-05 ENCOUNTER — LAB (OUTPATIENT)
Dept: LAB | Facility: HOSPITAL | Age: 71
End: 2021-04-05

## 2021-04-05 DIAGNOSIS — N18.30 STAGE 3 CHRONIC KIDNEY DISEASE, UNSPECIFIED WHETHER STAGE 3A OR 3B CKD (HCC): ICD-10-CM

## 2021-04-05 DIAGNOSIS — E11.9 TYPE 2 DIABETES MELLITUS WITHOUT COMPLICATION, UNSPECIFIED WHETHER LONG TERM INSULIN USE (HCC): ICD-10-CM

## 2021-04-05 DIAGNOSIS — N18.30 STAGE 3 CHRONIC KIDNEY DISEASE, UNSPECIFIED WHETHER STAGE 3A OR 3B CKD (HCC): Primary | ICD-10-CM

## 2021-04-05 LAB
25(OH)D3 SERPL-MCNC: 43 NG/ML (ref 30–100)
ALBUMIN SERPL-MCNC: 4.1 G/DL (ref 3.5–5.2)
ALBUMIN/GLOB SERPL: 1.4 G/DL
ALP SERPL-CCNC: 68 U/L (ref 39–117)
ALT SERPL W P-5'-P-CCNC: 24 U/L (ref 1–33)
ANION GAP SERPL CALCULATED.3IONS-SCNC: 9.4 MMOL/L (ref 5–15)
AST SERPL-CCNC: 20 U/L (ref 1–32)
BASOPHILS # BLD AUTO: 0.05 10*3/MM3 (ref 0–0.2)
BASOPHILS NFR BLD AUTO: 0.6 % (ref 0–1.5)
BILIRUB SERPL-MCNC: 0.2 MG/DL (ref 0–1.2)
BUN SERPL-MCNC: 30 MG/DL (ref 8–23)
BUN/CREAT SERPL: 22.9 (ref 7–25)
CALCIUM SPEC-SCNC: 9.5 MG/DL (ref 8.6–10.5)
CHLORIDE SERPL-SCNC: 103 MMOL/L (ref 98–107)
CK SERPL-CCNC: 56 U/L (ref 20–180)
CO2 SERPL-SCNC: 27.6 MMOL/L (ref 22–29)
CREAT SERPL-MCNC: 1.31 MG/DL (ref 0.57–1)
DEPRECATED RDW RBC AUTO: 46.1 FL (ref 37–54)
EOSINOPHIL # BLD AUTO: 0.3 10*3/MM3 (ref 0–0.4)
EOSINOPHIL NFR BLD AUTO: 3.6 % (ref 0.3–6.2)
ERYTHROCYTE [DISTWIDTH] IN BLOOD BY AUTOMATED COUNT: 13.3 % (ref 12.3–15.4)
GFR SERPL CREATININE-BSD FRML MDRD: 40 ML/MIN/1.73
GLOBULIN UR ELPH-MCNC: 3 GM/DL
GLUCOSE SERPL-MCNC: 156 MG/DL (ref 65–99)
HBA1C MFR BLD: 6.4 % (ref 3.5–5.6)
HCT VFR BLD AUTO: 43.5 % (ref 34–46.6)
HGB BLD-MCNC: 14.2 G/DL (ref 12–15.9)
IMM GRANULOCYTES # BLD AUTO: 0.01 10*3/MM3 (ref 0–0.05)
IMM GRANULOCYTES NFR BLD AUTO: 0.1 % (ref 0–0.5)
LYMPHOCYTES # BLD AUTO: 2.89 10*3/MM3 (ref 0.7–3.1)
LYMPHOCYTES NFR BLD AUTO: 34.5 % (ref 19.6–45.3)
MAGNESIUM SERPL-MCNC: 1.9 MG/DL (ref 1.6–2.4)
MCH RBC QN AUTO: 30.9 PG (ref 26.6–33)
MCHC RBC AUTO-ENTMCNC: 32.6 G/DL (ref 31.5–35.7)
MCV RBC AUTO: 94.8 FL (ref 79–97)
MONOCYTES # BLD AUTO: 0.55 10*3/MM3 (ref 0.1–0.9)
MONOCYTES NFR BLD AUTO: 6.6 % (ref 5–12)
NEUTROPHILS NFR BLD AUTO: 4.58 10*3/MM3 (ref 1.7–7)
NEUTROPHILS NFR BLD AUTO: 54.6 % (ref 42.7–76)
NRBC BLD AUTO-RTO: 0 /100 WBC (ref 0–0.2)
PLATELET # BLD AUTO: 277 10*3/MM3 (ref 140–450)
PMV BLD AUTO: 11.5 FL (ref 6–12)
POTASSIUM SERPL-SCNC: 5.2 MMOL/L (ref 3.5–5.2)
PROT SERPL-MCNC: 7.1 G/DL (ref 6–8.5)
PTH-INTACT SERPL-MCNC: 32.3 PG/ML (ref 15–65)
RBC # BLD AUTO: 4.59 10*6/MM3 (ref 3.77–5.28)
SODIUM SERPL-SCNC: 140 MMOL/L (ref 136–145)
TSH SERPL DL<=0.05 MIU/L-ACNC: 1.01 UIU/ML (ref 0.27–4.2)
URATE SERPL-MCNC: 5.2 MG/DL (ref 2.4–5.7)
WBC # BLD AUTO: 8.38 10*3/MM3 (ref 3.4–10.8)

## 2021-04-05 PROCEDURE — 82550 ASSAY OF CK (CPK): CPT

## 2021-04-05 PROCEDURE — 83036 HEMOGLOBIN GLYCOSYLATED A1C: CPT

## 2021-04-05 PROCEDURE — 80053 COMPREHEN METABOLIC PANEL: CPT

## 2021-04-05 PROCEDURE — 82306 VITAMIN D 25 HYDROXY: CPT

## 2021-04-05 PROCEDURE — 84550 ASSAY OF BLOOD/URIC ACID: CPT

## 2021-04-05 PROCEDURE — 83970 ASSAY OF PARATHORMONE: CPT

## 2021-04-05 PROCEDURE — 83735 ASSAY OF MAGNESIUM: CPT

## 2021-04-05 PROCEDURE — 36415 COLL VENOUS BLD VENIPUNCTURE: CPT

## 2021-04-05 PROCEDURE — 84443 ASSAY THYROID STIM HORMONE: CPT

## 2021-04-05 PROCEDURE — 85025 COMPLETE CBC W/AUTO DIFF WBC: CPT

## 2021-09-08 NOTE — ANESTHESIA PREPROCEDURE EVALUATION
Anesthesia Evaluation     Patient summary reviewed and Nursing notes reviewed   NPO Solid Status: > 8 hours  NPO Liquid Status: > 8 hours           Airway   Mallampati: III  TM distance: <3 FB  Neck ROM: full  No difficulty expected  Dental    (+) edentulous    Pulmonary - normal exam   (+) a smoker Current Abstained day of surgery, COPD mild, shortness of breath,   Cardiovascular - normal exam    ECG reviewed    (+) hypertension well controlled, CAD, CABG >6 Months,       Neuro/Psych  (+) psychiatric history Anxiety,     GI/Hepatic/Renal/Endo    (+)   diabetes mellitus type 2 well controlled,     Musculoskeletal (-) negative ROS    Abdominal  - normal exam   Substance History - negative use     OB/GYN negative ob/gyn ROS         Other                      Anesthesia Plan    ASA 3     MAC     intravenous induction   Anesthetic plan, all risks, benefits, and alternatives have been provided, discussed and informed consent has been obtained with: patient.       Malou Polanco

## 2021-09-09 ENCOUNTER — OFFICE VISIT (OUTPATIENT)
Dept: CARDIOLOGY | Facility: CLINIC | Age: 71
End: 2021-09-09

## 2021-09-09 VITALS
HEART RATE: 74 BPM | HEIGHT: 67 IN | WEIGHT: 182 LBS | OXYGEN SATURATION: 95 % | BODY MASS INDEX: 28.56 KG/M2 | SYSTOLIC BLOOD PRESSURE: 150 MMHG | DIASTOLIC BLOOD PRESSURE: 82 MMHG

## 2021-09-09 DIAGNOSIS — E78.00 PURE HYPERCHOLESTEROLEMIA: ICD-10-CM

## 2021-09-09 DIAGNOSIS — I10 ESSENTIAL HYPERTENSION: ICD-10-CM

## 2021-09-09 DIAGNOSIS — E11.9 TYPE 2 DIABETES MELLITUS WITHOUT COMPLICATION, WITHOUT LONG-TERM CURRENT USE OF INSULIN (HCC): ICD-10-CM

## 2021-09-09 DIAGNOSIS — J44.9 CHRONIC OBSTRUCTIVE PULMONARY DISEASE, UNSPECIFIED COPD TYPE (HCC): ICD-10-CM

## 2021-09-09 DIAGNOSIS — I25.118 CORONARY ARTERY DISEASE OF NATIVE ARTERY OF NATIVE HEART WITH STABLE ANGINA PECTORIS (HCC): Primary | ICD-10-CM

## 2021-09-09 PROCEDURE — 99214 OFFICE O/P EST MOD 30 MIN: CPT | Performed by: INTERNAL MEDICINE

## 2021-09-09 NOTE — PROGRESS NOTES
Subjective:     Encounter Date:09/09/2021      Patient ID: Kathy Wu is a 71 y.o. female.    Chief Complaint: Coronary artery disease  History of Present Illness 71-year-old white female with history of coronary disease hypertension hyperlipidemia diabetes and COPD is to my office for follow-up.  Patient is currently stable without any symptoms of chest pain or shortness of breath at rest on exertion.  No complaints any PND orthopnea.  No palpitation dizziness syncope or swelling of the feet but she is taking meds regular but she still continues to smoke.  She is not able to exercise very well.  She does not follow a good diet.    The following portions of the patient's history were reviewed and updated as appropriate: allergies, current medications, past family history, past medical history, past social history, past surgical history and problem list.  Past Medical History:   Diagnosis Date   • 3-vessel CAD 09/06/2018    Severe on Cardiac Cath; Daily Aspirin--S/p CABG   • Abnormal EKG 09/04/2018   • Anxiety     Xanax   • Breast pain, left 09/06/2018    Per Pt   • Cataract    • Chest pain 09/2018   • Cholecystitis 07/2007    S/p Cholecystectomy   • Cholecystolithiasis 07/2007    S/p Cholecystectomy   • Colitis 2007   • COPD (chronic obstructive pulmonary disease) (CMS/[facility])    • Diverticulosis 2007   • DM (diabetes mellitus) (CMS/[facility]) Since 1948    T2--Metformin Currently   • Dyslipidemia     Controlled w/Meds   • Family history of heart disease     Mother & Father Both Had MI's   • Gastritis 2007   • History of bone density study 07/31/2008-[facility]    NL   • Hx of chest x-ray 09/4/18-[facility]    WNL w/Calcified Atherosclerotic Disease in Thoracic Aorta   • Hx of CT scan of chest 09/4/18-[facility]    Normal Findings with Degenerative Changes of Spine; No Evidence of Pulmonary Embolous Seen; 2.1CM Thyroid Nodule Noted   • Hypertension Since 1948    Controlled w/Meds   • Kidney disease, chronic, stage III  "(GFR 30-59 ml/min) (CMS/[facility])    • Nasal congestion 2016    Treated @ [facility] w/Antibiotics   • Postmenopausal    • Renal disease    • Ringing of ears, bilateral    • SOB (shortness of breath)     Chronic   • Stress    • Tobacco use     1 PPD     Past Surgical History:   Procedure Laterality Date   • BREAST SURGERY Right     biopsy   [facility]. Hurtado--Severe L Main 3-V CAD   • CARDIAC SURGERY     • CHOLECYSTECTOMY  2007    Lap--Dusty Hernandez MD   • COLONOSCOPY     • COLONOSCOPY N/A 2019    Procedure: COLONOSCOPY with polypectomy X2;  Surgeon: Jazz More MD;  Location: [facility] ENDOSCOPY;  Service: Gastroenterology   • CORONARY [facility]FT  [facility]    Urgent x 4 with Left Saphenous Vein Grafting-Dr. Palmer   • ENDOSCOPIC [facility]VEST  [facility]    Of R Lower Extremity-Dr. Palmer   • ENDOSCOPY  07-[facility]    w/EGD-Erich Wasserman MD   • HAND SURGERY     • LAPAROSCOPIC TUBAL LIGATION       /82 (BP Location: Left arm, Patient Position: Sitting, Cuff Size: Adult)   Pulse 74   Ht 168.9 cm (66.5\")   Wt 82.6 kg (182 lb)   SpO2 95%   BMI 28.94 kg/m²   Family History   Problem Relation Age of Onset   • Heart attack Mother    • Coronary artery disease Mother         Had CABG   • Heart attack Father          Age 63       Current Outpatient Medications:   •  ALPRAZolam (XANAX) 1 MG tablet, Take 1 mg by mouth 4 (Four) Times a Day., Disp: , Rfl:   •  aspirin 325 MG EC tablet, Take 325 mg by mouth Daily., Disp: , Rfl:   •  atorvastatin (LIPITOR) 40 MG tablet, Take 40 mg by mouth Daily., Disp: , Rfl:   •  busPIRone (BUSPAR) 10 MG tablet, Take 10 mg by mouth 2 (Two) Times a Day., Disp: , Rfl:   •  calcium carbonate (TUMS) 500 MG chewable tablet, Chew 1 tablet 3 (Three) Times a Day As Needed for Indigestion or Heartburn., Disp: , Rfl:   •  furosemide (LASIX) 20 MG tablet, Take 20 mg by mouth 2 (Two) Times a Week. Monday and Friday, Disp: , " Rfl:   •  ipratropium-albuterol (DUO-NEB) 0.5-2.5 mg/3 ml nebulizer, Take 3 mL by nebulization 4 (Four) Times a Day., Disp: , Rfl:   •  losartan (COZAAR) 50 MG tablet, Take 50 mg by mouth Daily., Disp: , Rfl:   •  magnesium oxide (MAGOX) 400 (241.3 Mg) MG tablet tablet, Take 400 mg by mouth Daily., Disp: , Rfl:   •  metFORMIN (GLUCOPHAGE) 500 MG tablet, Take 500 mg by mouth 2 (Two) Times a Day With Meals., Disp: , Rfl:   •  nateglinide (STARLIX) 120 MG tablet, Take 120 mg by mouth 2 (Two) Times a Day., Disp: , Rfl:   •  Omega-3 Fatty Acids (fish oil) 1000 MG capsule capsule, Take  by mouth 2 (Two) Times a Day With Meals., Disp: , Rfl:   •  vitamin D (ERGOCALCIFEROL) 1.25 MG (27402 UT) capsule capsule, Take 50,000 Units by mouth 1 (One) Time Per Week., Disp: , Rfl:   Allergies   Allergen Reactions   • Codeine Unknown (See Comments)     Unknown     Social History     Socioeconomic History   • Marital status:      Spouse name: Don   • Number of children: Not on file   • Years of education: Not on file   • Highest education level: Not on file   Tobacco Use   • Smoking status: Current Every Day Smoker     Packs/day: 1.00     Types: Cigarettes     Last attempt to quit: 9/4/2018     Years since quitting: 3.0   • Smokeless tobacco: Never Used   Vaping Use   • Vaping Use: Never used   Substance and Sexual Activity   • Alcohol use: No   • Drug use: No   • Sexual activity: Defer     Birth control/protection: Post-menopausal, Surgical     Comment: TL     Review of Systems   Constitutional: Negative for fever and malaise/fatigue.   HENT: Negative for congestion and hearing loss.    Eyes: Negative for double vision and visual disturbance.   Cardiovascular: Negative for chest pain, claudication, dyspnea on exertion, leg swelling and syncope.   Respiratory: Negative for cough and shortness of breath.    Endocrine: Negative for cold intolerance.   Skin: Negative for color change and rash.   Musculoskeletal: Negative for  arthritis and joint pain.   Gastrointestinal: Negative for abdominal pain and heartburn.   Genitourinary: Negative for hematuria.   Neurological: Negative for excessive daytime sleepiness and dizziness.   Psychiatric/Behavioral: Negative for depression. The patient is not nervous/anxious.    All other systems reviewed and are negative.             Objective:     Constitutional:       Appearance: Well-developed.   Eyes:      General: No scleral icterus.     Conjunctiva/sclera: Conjunctivae normal.   HENT:      Head: Normocephalic and atraumatic.   Neck:      Vascular: No carotid bruit or JVD.   Pulmonary:      Effort: Pulmonary effort is normal.      Breath sounds: Normal breath sounds. No wheezing. No rales.   Cardiovascular:      Normal rate. Regular rhythm.   Pulses:     Intact distal pulses.   Abdominal:      General: Bowel sounds are normal.      Palpations: Abdomen is soft.   Musculoskeletal:      Cervical back: Normal range of motion and neck supple. Skin:     General: Skin is warm and dry.      Findings: No rash.   Neurological:      Mental Status: Alert.       Procedures    Lab Review:         MDM  1.  Coronary disease  Patient has nonobstructive disease now with normal LV systolic function is currently stable on medications  2.  Hypertension  Patient blood pressures currently stable on medications including losartan  3.  Hyperlipidemia  Patient lipid levels are followed by primary doctor is on Lipitor  4.  Diabetes  Patient is on Metformin  5.  COPD  Patient still continues to smoke and is advised to stop smoking      Patient's previous medical records, labs, and EKG were reviewed and discussed with the patient at today's visit.

## 2021-10-01 ENCOUNTER — LAB (OUTPATIENT)
Dept: LAB | Facility: HOSPITAL | Age: 71
End: 2021-10-01

## 2021-10-01 ENCOUNTER — TRANSCRIBE ORDERS (OUTPATIENT)
Dept: ADMINISTRATIVE | Facility: HOSPITAL | Age: 71
End: 2021-10-01

## 2021-10-01 DIAGNOSIS — N18.30 STAGE 3 CHRONIC KIDNEY DISEASE, UNSPECIFIED WHETHER STAGE 3A OR 3B CKD (HCC): Primary | ICD-10-CM

## 2021-10-01 DIAGNOSIS — E55.9 VITAMIN D DEFICIENCY DISEASE: ICD-10-CM

## 2021-10-01 DIAGNOSIS — E11.8 DIABETIC COMPLICATION (HCC): ICD-10-CM

## 2021-10-01 DIAGNOSIS — N18.30 STAGE 3 CHRONIC KIDNEY DISEASE, UNSPECIFIED WHETHER STAGE 3A OR 3B CKD (HCC): ICD-10-CM

## 2021-10-01 LAB
25(OH)D3 SERPL-MCNC: 50.9 NG/ML
ANION GAP SERPL CALCULATED.3IONS-SCNC: 11.1 MMOL/L (ref 5–15)
BACTERIA UR QL AUTO: ABNORMAL /HPF
BASOPHILS # BLD AUTO: 0.06 10*3/MM3 (ref 0–0.2)
BASOPHILS NFR BLD AUTO: 0.8 % (ref 0–1.5)
BILIRUB UR QL STRIP: NEGATIVE
BUN SERPL-MCNC: 33 MG/DL (ref 8–23)
BUN/CREAT SERPL: 24.4 (ref 7–25)
CALCIUM SPEC-SCNC: 9.6 MG/DL (ref 8.6–10.5)
CHLORIDE SERPL-SCNC: 99 MMOL/L (ref 98–107)
CLARITY UR: CLEAR
CO2 SERPL-SCNC: 25.9 MMOL/L (ref 22–29)
COLOR UR: YELLOW
CREAT SERPL-MCNC: 1.35 MG/DL (ref 0.57–1)
CREAT UR-MCNC: 18.9 MG/DL
DEPRECATED RDW RBC AUTO: 44.1 FL (ref 37–54)
EOSINOPHIL # BLD AUTO: 0.24 10*3/MM3 (ref 0–0.4)
EOSINOPHIL NFR BLD AUTO: 3.3 % (ref 0.3–6.2)
ERYTHROCYTE [DISTWIDTH] IN BLOOD BY AUTOMATED COUNT: 12.4 % (ref 12.3–15.4)
GFR SERPL CREATININE-BSD FRML MDRD: 38 ML/MIN/1.73
GLUCOSE SERPL-MCNC: 142 MG/DL (ref 65–99)
GLUCOSE UR STRIP-MCNC: NEGATIVE MG/DL
HBA1C MFR BLD: 6.3 % (ref 3.5–5.6)
HCT VFR BLD AUTO: 43.6 % (ref 34–46.6)
HGB BLD-MCNC: 14 G/DL (ref 12–15.9)
HGB UR QL STRIP.AUTO: NEGATIVE
HYALINE CASTS UR QL AUTO: ABNORMAL /LPF
IMM GRANULOCYTES # BLD AUTO: 0.01 10*3/MM3 (ref 0–0.05)
IMM GRANULOCYTES NFR BLD AUTO: 0.1 % (ref 0–0.5)
KETONES UR QL STRIP: NEGATIVE
LEUKOCYTE ESTERASE UR QL STRIP.AUTO: ABNORMAL
LYMPHOCYTES # BLD AUTO: 2.79 10*3/MM3 (ref 0.7–3.1)
LYMPHOCYTES NFR BLD AUTO: 38.6 % (ref 19.6–45.3)
MCH RBC QN AUTO: 31.3 PG (ref 26.6–33)
MCHC RBC AUTO-ENTMCNC: 32.1 G/DL (ref 31.5–35.7)
MCV RBC AUTO: 97.3 FL (ref 79–97)
MONOCYTES # BLD AUTO: 0.43 10*3/MM3 (ref 0.1–0.9)
MONOCYTES NFR BLD AUTO: 6 % (ref 5–12)
NEUTROPHILS NFR BLD AUTO: 3.69 10*3/MM3 (ref 1.7–7)
NEUTROPHILS NFR BLD AUTO: 51.2 % (ref 42.7–76)
NITRITE UR QL STRIP: NEGATIVE
NRBC BLD AUTO-RTO: 0 /100 WBC (ref 0–0.2)
PH UR STRIP.AUTO: 6 [PH] (ref 5–8)
PHOSPHATE SERPL-MCNC: 3.7 MG/DL (ref 2.5–4.5)
PLATELET # BLD AUTO: 284 10*3/MM3 (ref 140–450)
PMV BLD AUTO: 11.4 FL (ref 6–12)
POTASSIUM SERPL-SCNC: 5.1 MMOL/L (ref 3.5–5.2)
PROT UR QL STRIP: ABNORMAL
PROT UR-MCNC: 20 MG/DL
PTH-INTACT SERPL-MCNC: 27.8 PG/ML (ref 15–65)
RBC # BLD AUTO: 4.48 10*6/MM3 (ref 3.77–5.28)
RBC # UR: ABNORMAL /HPF
REF LAB TEST METHOD: ABNORMAL
SODIUM SERPL-SCNC: 136 MMOL/L (ref 136–145)
SP GR UR STRIP: 1.01 (ref 1–1.03)
SQUAMOUS #/AREA URNS HPF: ABNORMAL /HPF
UROBILINOGEN UR QL STRIP: ABNORMAL
WBC # BLD AUTO: 7.22 10*3/MM3 (ref 3.4–10.8)
WBC UR QL AUTO: ABNORMAL /HPF

## 2021-10-01 PROCEDURE — 84100 ASSAY OF PHOSPHORUS: CPT

## 2021-10-01 PROCEDURE — 87186 SC STD MICRODIL/AGAR DIL: CPT

## 2021-10-01 PROCEDURE — 84156 ASSAY OF PROTEIN URINE: CPT

## 2021-10-01 PROCEDURE — 85025 COMPLETE CBC W/AUTO DIFF WBC: CPT

## 2021-10-01 PROCEDURE — 36415 COLL VENOUS BLD VENIPUNCTURE: CPT

## 2021-10-01 PROCEDURE — 82306 VITAMIN D 25 HYDROXY: CPT

## 2021-10-01 PROCEDURE — 80048 BASIC METABOLIC PNL TOTAL CA: CPT

## 2021-10-01 PROCEDURE — 83036 HEMOGLOBIN GLYCOSYLATED A1C: CPT

## 2021-10-01 PROCEDURE — 81001 URINALYSIS AUTO W/SCOPE: CPT

## 2021-10-01 PROCEDURE — 82570 ASSAY OF URINE CREATININE: CPT

## 2021-10-01 PROCEDURE — 87086 URINE CULTURE/COLONY COUNT: CPT

## 2021-10-01 PROCEDURE — 87077 CULTURE AEROBIC IDENTIFY: CPT

## 2021-10-01 PROCEDURE — 83970 ASSAY OF PARATHORMONE: CPT

## 2021-10-03 LAB — BACTERIA SPEC AEROBE CULT: ABNORMAL

## 2021-10-13 ENCOUNTER — LAB (OUTPATIENT)
Dept: LAB | Facility: HOSPITAL | Age: 71
End: 2021-10-13

## 2021-10-13 ENCOUNTER — TRANSCRIBE ORDERS (OUTPATIENT)
Dept: ADMINISTRATIVE | Facility: HOSPITAL | Age: 71
End: 2021-10-13

## 2021-10-13 DIAGNOSIS — N39.0 URINARY TRACT INFECTION WITHOUT HEMATURIA, SITE UNSPECIFIED: Primary | ICD-10-CM

## 2021-10-13 DIAGNOSIS — N39.0 URINARY TRACT INFECTION WITHOUT HEMATURIA, SITE UNSPECIFIED: ICD-10-CM

## 2021-10-13 LAB
BACTERIA UR QL AUTO: NORMAL /HPF
BILIRUB UR QL STRIP: NEGATIVE
CLARITY UR: CLEAR
COLOR UR: YELLOW
GLUCOSE UR STRIP-MCNC: NEGATIVE MG/DL
HGB UR QL STRIP.AUTO: NEGATIVE
HYALINE CASTS UR QL AUTO: NORMAL /LPF
KETONES UR QL STRIP: NEGATIVE
LEUKOCYTE ESTERASE UR QL STRIP.AUTO: NEGATIVE
NITRITE UR QL STRIP: NEGATIVE
PH UR STRIP.AUTO: 6 [PH] (ref 5–8)
PROT UR QL STRIP: ABNORMAL
RBC # UR: NORMAL /HPF
REF LAB TEST METHOD: NORMAL
SP GR UR STRIP: 1.01 (ref 1–1.03)
SQUAMOUS #/AREA URNS HPF: NORMAL /HPF
UROBILINOGEN UR QL STRIP: ABNORMAL
WBC UR QL AUTO: NORMAL /HPF

## 2021-10-13 PROCEDURE — 81001 URINALYSIS AUTO W/SCOPE: CPT

## 2021-10-13 PROCEDURE — 87086 URINE CULTURE/COLONY COUNT: CPT

## 2021-10-14 LAB — BACTERIA SPEC AEROBE CULT: NO GROWTH

## 2022-04-19 ENCOUNTER — TRANSCRIBE ORDERS (OUTPATIENT)
Dept: ADMINISTRATIVE | Facility: HOSPITAL | Age: 72
End: 2022-04-19

## 2022-04-19 ENCOUNTER — LAB (OUTPATIENT)
Dept: LAB | Facility: HOSPITAL | Age: 72
End: 2022-04-19

## 2022-04-19 DIAGNOSIS — M15.9 GENERALIZED OSTEOARTHROSIS, INVOLVING MULTIPLE SITES: ICD-10-CM

## 2022-04-19 DIAGNOSIS — E11.8 DIABETIC COMPLICATION: ICD-10-CM

## 2022-04-19 DIAGNOSIS — N18.30 STAGE 3 CHRONIC KIDNEY DISEASE, UNSPECIFIED WHETHER STAGE 3A OR 3B CKD: Primary | ICD-10-CM

## 2022-04-19 DIAGNOSIS — E83.42 HYPOMAGNESEMIA: ICD-10-CM

## 2022-04-19 DIAGNOSIS — R80.9 PROTEINURIA, UNSPECIFIED TYPE: ICD-10-CM

## 2022-04-19 DIAGNOSIS — N18.30 STAGE 3 CHRONIC KIDNEY DISEASE, UNSPECIFIED WHETHER STAGE 3A OR 3B CKD: ICD-10-CM

## 2022-04-19 LAB
25(OH)D3 SERPL-MCNC: 46.7 NG/ML (ref 30–100)
ANION GAP SERPL CALCULATED.3IONS-SCNC: 9 MMOL/L (ref 5–15)
BACTERIA UR QL AUTO: NORMAL /HPF
BASOPHILS # BLD AUTO: 0.04 10*3/MM3 (ref 0–0.2)
BASOPHILS NFR BLD AUTO: 0.5 % (ref 0–1.5)
BILIRUB UR QL STRIP: NEGATIVE
BUN SERPL-MCNC: 23 MG/DL (ref 8–23)
BUN/CREAT SERPL: 19.8 (ref 7–25)
CALCIUM SPEC-SCNC: 9.6 MG/DL (ref 8.6–10.5)
CHLORIDE SERPL-SCNC: 100 MMOL/L (ref 98–107)
CLARITY UR: CLEAR
CO2 SERPL-SCNC: 27 MMOL/L (ref 22–29)
COLOR UR: YELLOW
CREAT SERPL-MCNC: 1.16 MG/DL (ref 0.57–1)
CREAT UR-MCNC: 29.1 MG/DL
DEPRECATED RDW RBC AUTO: 42.3 FL (ref 37–54)
EGFRCR SERPLBLD CKD-EPI 2021: 49.6 ML/MIN/1.73
EOSINOPHIL # BLD AUTO: 0.23 10*3/MM3 (ref 0–0.4)
EOSINOPHIL NFR BLD AUTO: 3.1 % (ref 0.3–6.2)
ERYTHROCYTE [DISTWIDTH] IN BLOOD BY AUTOMATED COUNT: 12.2 % (ref 12.3–15.4)
GLUCOSE SERPL-MCNC: 124 MG/DL (ref 65–99)
GLUCOSE UR STRIP-MCNC: NEGATIVE MG/DL
HBA1C MFR BLD: 5.8 % (ref 3.5–5.6)
HCT VFR BLD AUTO: 44 % (ref 34–46.6)
HGB BLD-MCNC: 14.7 G/DL (ref 12–15.9)
HGB UR QL STRIP.AUTO: NEGATIVE
HYALINE CASTS UR QL AUTO: NORMAL /LPF
IMM GRANULOCYTES # BLD AUTO: 0.01 10*3/MM3 (ref 0–0.05)
IMM GRANULOCYTES NFR BLD AUTO: 0.1 % (ref 0–0.5)
KETONES UR QL STRIP: NEGATIVE
LEUKOCYTE ESTERASE UR QL STRIP.AUTO: NEGATIVE
LYMPHOCYTES # BLD AUTO: 3.08 10*3/MM3 (ref 0.7–3.1)
LYMPHOCYTES NFR BLD AUTO: 42.1 % (ref 19.6–45.3)
MAGNESIUM SERPL-MCNC: 1.8 MG/DL (ref 1.6–2.4)
MCH RBC QN AUTO: 31.9 PG (ref 26.6–33)
MCHC RBC AUTO-ENTMCNC: 33.4 G/DL (ref 31.5–35.7)
MCV RBC AUTO: 95.4 FL (ref 79–97)
MONOCYTES # BLD AUTO: 0.45 10*3/MM3 (ref 0.1–0.9)
MONOCYTES NFR BLD AUTO: 6.2 % (ref 5–12)
NEUTROPHILS NFR BLD AUTO: 3.5 10*3/MM3 (ref 1.7–7)
NEUTROPHILS NFR BLD AUTO: 48 % (ref 42.7–76)
NITRITE UR QL STRIP: NEGATIVE
NRBC BLD AUTO-RTO: 0 /100 WBC (ref 0–0.2)
PH UR STRIP.AUTO: 6.5 [PH] (ref 5–8)
PHOSPHATE SERPL-MCNC: 3.7 MG/DL (ref 2.5–4.5)
PLATELET # BLD AUTO: 263 10*3/MM3 (ref 140–450)
PMV BLD AUTO: 11.3 FL (ref 6–12)
POTASSIUM SERPL-SCNC: 4.7 MMOL/L (ref 3.5–5.2)
PROT ?TM UR-MCNC: 43.7 MG/DL
PROT UR QL STRIP: ABNORMAL
PROT/CREAT UR: 1501.7 MG/G CREA (ref 0–200)
PTH-INTACT SERPL-MCNC: 26.6 PG/ML (ref 15–65)
RBC # BLD AUTO: 4.61 10*6/MM3 (ref 3.77–5.28)
RBC # UR STRIP: NORMAL /HPF
REF LAB TEST METHOD: NORMAL
SODIUM SERPL-SCNC: 136 MMOL/L (ref 136–145)
SP GR UR STRIP: 1.01 (ref 1–1.03)
SQUAMOUS #/AREA URNS HPF: NORMAL /HPF
URATE SERPL-MCNC: 4.4 MG/DL (ref 2.4–5.7)
UROBILINOGEN UR QL STRIP: ABNORMAL
WBC # UR STRIP: NORMAL /HPF
WBC NRBC COR # BLD: 7.31 10*3/MM3 (ref 3.4–10.8)

## 2022-04-19 PROCEDURE — 85025 COMPLETE CBC W/AUTO DIFF WBC: CPT

## 2022-04-19 PROCEDURE — 84156 ASSAY OF PROTEIN URINE: CPT

## 2022-04-19 PROCEDURE — 84100 ASSAY OF PHOSPHORUS: CPT

## 2022-04-19 PROCEDURE — 84550 ASSAY OF BLOOD/URIC ACID: CPT

## 2022-04-19 PROCEDURE — 81001 URINALYSIS AUTO W/SCOPE: CPT

## 2022-04-19 PROCEDURE — 80048 BASIC METABOLIC PNL TOTAL CA: CPT

## 2022-04-19 PROCEDURE — 36415 COLL VENOUS BLD VENIPUNCTURE: CPT

## 2022-04-19 PROCEDURE — 82570 ASSAY OF URINE CREATININE: CPT

## 2022-04-19 PROCEDURE — 82306 VITAMIN D 25 HYDROXY: CPT

## 2022-04-19 PROCEDURE — 83735 ASSAY OF MAGNESIUM: CPT

## 2022-04-19 PROCEDURE — 83036 HEMOGLOBIN GLYCOSYLATED A1C: CPT

## 2022-04-19 PROCEDURE — 83970 ASSAY OF PARATHORMONE: CPT

## 2022-04-21 ENCOUNTER — OFFICE VISIT (OUTPATIENT)
Dept: CARDIOLOGY | Facility: CLINIC | Age: 72
End: 2022-04-21

## 2022-04-21 VITALS
HEART RATE: 69 BPM | SYSTOLIC BLOOD PRESSURE: 142 MMHG | HEIGHT: 67 IN | WEIGHT: 176 LBS | DIASTOLIC BLOOD PRESSURE: 79 MMHG | OXYGEN SATURATION: 97 % | BODY MASS INDEX: 27.62 KG/M2

## 2022-04-21 DIAGNOSIS — I25.118 CORONARY ARTERY DISEASE OF NATIVE ARTERY OF NATIVE HEART WITH STABLE ANGINA PECTORIS: Primary | ICD-10-CM

## 2022-04-21 DIAGNOSIS — E78.00 PURE HYPERCHOLESTEROLEMIA: ICD-10-CM

## 2022-04-21 DIAGNOSIS — E11.9 TYPE 2 DIABETES MELLITUS WITHOUT COMPLICATION, WITHOUT LONG-TERM CURRENT USE OF INSULIN: ICD-10-CM

## 2022-04-21 DIAGNOSIS — J44.9 CHRONIC OBSTRUCTIVE PULMONARY DISEASE, UNSPECIFIED COPD TYPE: ICD-10-CM

## 2022-04-21 DIAGNOSIS — I10 ESSENTIAL HYPERTENSION: ICD-10-CM

## 2022-04-21 PROCEDURE — 99214 OFFICE O/P EST MOD 30 MIN: CPT | Performed by: INTERNAL MEDICINE

## 2022-04-21 NOTE — PROGRESS NOTES
Subjective:     Encounter Date:04/21/2022      Patient ID: Kathy Wu is a 72 y.o. female.    Chief Complaint:  History of Present Illness 72-year-old white female with history of coronary disease history of diabetes hypertension hyperlipidemia and COPD presents to my office for follow-up.  Patient is currently stable without any symptoms of chest pain or shortness of breath at rest on exertion.  No complains any PND orthopnea.  No palpitation dizziness syncope or swelling of the feet.  Patient has been taking all medicines regular.  Patient still continues to smoke.  She does not exercise regularly and does not follow a good diet.    The following portions of the patient's history were reviewed and updated as appropriate: allergies, current medications, past family history, past medical history, past social history, past surgical history and problem list.  Past Medical History:   Diagnosis Date   • 3-vessel CAD 09/06/2018    Severe on Cardiac Cath; Daily Aspirin--S/p CABG   • Abnormal EKG 09/04/2018   • Anxiety     Xanax   • Breast pain, left 09/06/2018    Per Pt   • Cataract    • Chest pain 09/2018   • Cholecystitis 07/2007    S/p Cholecystectomy   • Cholecystolithiasis 07/2007    S/p Cholecystectomy   • Colitis 2007   • COPD (chronic obstructive pulmonary disease) (HCC)    • Diverticulosis 2007   • DM (diabetes mellitus) (HCC) Since 1948    T2--Metformin Currently   • Dyslipidemia     Controlled w/Meds   • Family history of heart disease     Mother & Father Both Had MI's   • Gastritis 2007   • History of bone density study 07/31/2008-[facility]    NL   • Hx of chest x-ray 09/4/18-[facility]    WNL w/Calcified Atherosclerotic Disease in Thoracic Aorta   • Hx of CT scan of chest 09/4/18-[facility]    Normal Findings with Degenerative Changes of Spine; No Evidence of Pulmonary Embolous Seen; 2.1CM Thyroid Nodule Noted   • Hypertension Since 1948    Controlled w/Meds   • Kidney disease, chronic, stage III (GFR  "30-59 ml/min) ([facility])    • Nasal congestion 2016    Treated @ [facility] w/Antibiotics   • Postmenopausal    • Renal disease    • Ringing of ears, bilateral    • SOB (shortness of breath)     Chronic   • Stress    • Tobacco use     1 PPD     Past Surgical History:   Procedure Laterality Date   • BREAST SURGERY Right     biopsy   • CARDIAC CATHETERIZATION  18-[facility]    Dr. Hurtado--Severe L Main 3-V CAD   • CARDIAC SURGERY     • CHOLECYSTECTOMY  2007    Lap--Dusty Hernandez MD   • COLONOSCOPY     • COLONOSCOPY N/A 2019    Procedure: COLONOSCOPY with polypectomy X2;  Surgeon: Jazz More MD;  Location: [facility] ENDOSCOPY;  Service: Gastroenterology   • CORONARY ARTERY BYPASS GRAFT  18-[facility]    Urgent x 4 with Left Saphenous Vein Grafting-Dr. Palmer   • ENDOSCOPIC VEIN HARVEST  18-[facility]    Of R Lower Extremity-Dr. Palmer   • ENDOSCOPY  07-[facility]    w/EGD-Erich Wasserman MD   • HAND SURGERY     • LAPAROSCOPIC TUBAL LIGATION       /79 (BP Location: Left arm, Patient Position: Sitting)   Pulse 69   Ht 168.9 cm (66.5\")   Wt 79.8 kg (176 lb)   SpO2 97%   BMI 27.98 kg/m²   Family History   Problem Relation Age of Onset   • Heart attack Mother    • Coronary artery disease Mother         Had CABG   • Heart attack Father          Age 63       Current Outpatient Medications:   •  Alpha-Lipoic Acid 300 MG tablet, Take  by mouth., Disp: , Rfl:   •  ALPRAZolam (XANAX) 1 MG tablet, Take 1 mg by mouth 4 (Four) Times a Day., Disp: , Rfl:   •  aspirin 325 MG EC tablet, Take 325 mg by mouth Daily., Disp: , Rfl:   •  atorvastatin (LIPITOR) 40 MG tablet, Take 40 mg by mouth Daily., Disp: , Rfl:   •  busPIRone (BUSPAR) 10 MG tablet, Take 10 mg by mouth 2 (Two) Times a Day., Disp: , Rfl:   •  calcium carbonate (TUMS) 500 MG chewable tablet, Chew 1 tablet 3 (Three) Times a Day As Needed for Indigestion or Heartburn., Disp: , Rfl:   •  furosemide (LASIX) 20 MG tablet, Take 20 mg by mouth 2 (Two) " Times a Week. Monday and Friday, Disp: , Rfl:   •  ipratropium-albuterol (DUO-NEB) 0.5-2.5 mg/3 ml nebulizer, Take 3 mL by nebulization 4 (Four) Times a Day., Disp: , Rfl:   •  losartan (COZAAR) 50 MG tablet, Take 50 mg by mouth Daily., Disp: , Rfl:   •  magnesium oxide (MAGOX) 400 (241.3 Mg) MG tablet tablet, Take 400 mg by mouth Daily., Disp: , Rfl:   •  metFORMIN (GLUCOPHAGE) 500 MG tablet, Take 500 mg by mouth 2 (Two) Times a Day With Meals., Disp: , Rfl:   •  nateglinide (STARLIX) 120 MG tablet, Take 120 mg by mouth 2 (Two) Times a Day., Disp: , Rfl:   •  Omega-3 Fatty Acids (fish oil) 1000 MG capsule capsule, Take  by mouth 2 (Two) Times a Day With Meals., Disp: , Rfl:   •  vitamin D (ERGOCALCIFEROL) 1.25 MG (60139 UT) capsule capsule, Take 50,000 Units by mouth 1 (One) Time Per Week., Disp: , Rfl:   Allergies   Allergen Reactions   • Codeine Unknown (See Comments)     Unknown     Social History     Socioeconomic History   • Marital status:      Spouse name: Don   Tobacco Use   • Smoking status: Current Every Day Smoker     Packs/day: 1.00     Types: Cigarettes     Last attempt to quit: 9/4/2018     Years since quitting: 3.6   • Smokeless tobacco: Never Used   Vaping Use   • Vaping Use: Never used   Substance and Sexual Activity   • Alcohol use: No   • Drug use: No   • Sexual activity: Defer     Birth control/protection: Post-menopausal, Surgical     Comment: TL     Review of Systems   Constitutional: Negative for fever and malaise/fatigue.   Cardiovascular: Negative for chest pain, dyspnea on exertion and palpitations.   Respiratory: Negative for cough and shortness of breath.    Skin: Negative for rash.   Gastrointestinal: Negative for abdominal pain, nausea and vomiting.   Neurological: Negative for focal weakness and headaches.   All other systems reviewed and are negative.             Objective:     Constitutional:       Appearance: Well-developed.   Eyes:      General: No scleral icterus.      Conjunctiva/sclera: Conjunctivae normal.   HENT:      Head: Normocephalic and atraumatic.   Neck:      Vascular: No carotid bruit or JVD.   Pulmonary:      Effort: Pulmonary effort is normal.      Breath sounds: Normal breath sounds. No wheezing. No rales.   Cardiovascular:      Normal rate. Regular rhythm.   Pulses:     Intact distal pulses.   Abdominal:      General: Bowel sounds are normal.      Palpations: Abdomen is soft.   Musculoskeletal:      Cervical back: Normal range of motion and neck supple. Skin:     General: Skin is warm and dry.      Findings: No rash.   Neurological:      Mental Status: Alert.       Procedures    Lab Review:         MDM  1.  Coronary disease  Patient has nonobstructive disease with normal function is currently stable on medications  2.  Diabetes  Patient is on oral medicines and followed by the primary care doctor  3.  Hypertension  Patient blood pressure currently stable on medications including losartan  4.  Hyperlipidemia  Been on Lipitor the lipid levels are well within normal limits  5.  COPD  Patient has history of COPD and is currently smoking and is advised to stop smoking      Patient's previous medical records, labs, and EKG were reviewed and discussed with the patient at today's visit.

## 2022-07-15 ENCOUNTER — LAB (OUTPATIENT)
Dept: LAB | Facility: HOSPITAL | Age: 72
End: 2022-07-15

## 2022-07-15 ENCOUNTER — TRANSCRIBE ORDERS (OUTPATIENT)
Dept: ADMINISTRATIVE | Facility: HOSPITAL | Age: 72
End: 2022-07-15

## 2022-07-15 DIAGNOSIS — E87.5 HYPERKALEMIA: Primary | ICD-10-CM

## 2022-07-15 DIAGNOSIS — E87.5 HYPERKALEMIA: ICD-10-CM

## 2022-07-15 LAB
ANION GAP SERPL CALCULATED.3IONS-SCNC: 9.6 MMOL/L (ref 5–15)
BUN SERPL-MCNC: 25 MG/DL (ref 8–23)
BUN/CREAT SERPL: 19.2 (ref 7–25)
CALCIUM SPEC-SCNC: 10.1 MG/DL (ref 8.6–10.5)
CHLORIDE SERPL-SCNC: 103 MMOL/L (ref 98–107)
CO2 SERPL-SCNC: 27.4 MMOL/L (ref 22–29)
CREAT SERPL-MCNC: 1.3 MG/DL (ref 0.57–1)
EGFRCR SERPLBLD CKD-EPI 2021: 43.2 ML/MIN/1.73
GLUCOSE SERPL-MCNC: 133 MG/DL (ref 65–99)
POTASSIUM SERPL-SCNC: 4.9 MMOL/L (ref 3.5–5.2)
SODIUM SERPL-SCNC: 140 MMOL/L (ref 136–145)

## 2022-07-15 PROCEDURE — 80048 BASIC METABOLIC PNL TOTAL CA: CPT

## 2022-07-15 PROCEDURE — 36415 COLL VENOUS BLD VENIPUNCTURE: CPT

## 2022-10-13 ENCOUNTER — LAB (OUTPATIENT)
Dept: LAB | Facility: HOSPITAL | Age: 72
End: 2022-10-13

## 2022-10-13 ENCOUNTER — TRANSCRIBE ORDERS (OUTPATIENT)
Dept: ADMINISTRATIVE | Facility: HOSPITAL | Age: 72
End: 2022-10-13

## 2022-10-13 DIAGNOSIS — M15.9 GENERALIZED OSTEOARTHRITIS: ICD-10-CM

## 2022-10-13 DIAGNOSIS — E55.9 VITAMIN D DEFICIENCY, UNSPECIFIED: ICD-10-CM

## 2022-10-13 DIAGNOSIS — E11.8 TYPE II DIABETES MELLITUS WITH COMPLICATION: ICD-10-CM

## 2022-10-13 DIAGNOSIS — E83.42 HYPOMAGNESEMIA: ICD-10-CM

## 2022-10-13 DIAGNOSIS — E55.9 VITAMIN D DEFICIENCY, UNSPECIFIED: Primary | ICD-10-CM

## 2022-10-13 DIAGNOSIS — N18.30 STAGE 3 CHRONIC KIDNEY DISEASE, UNSPECIFIED WHETHER STAGE 3A OR 3B CKD: ICD-10-CM

## 2022-10-13 LAB
25(OH)D3 SERPL-MCNC: 62.7 NG/ML (ref 30–100)
ANION GAP SERPL CALCULATED.3IONS-SCNC: 9.7 MMOL/L (ref 5–15)
BACTERIA UR QL AUTO: NORMAL /HPF
BASOPHILS # BLD AUTO: 0.04 10*3/MM3 (ref 0–0.2)
BASOPHILS NFR BLD AUTO: 0.7 % (ref 0–1.5)
BILIRUB UR QL STRIP: NEGATIVE
BUN SERPL-MCNC: 23 MG/DL (ref 8–23)
BUN/CREAT SERPL: 18 (ref 7–25)
CALCIUM SPEC-SCNC: 9.3 MG/DL (ref 8.6–10.5)
CHLORIDE SERPL-SCNC: 101 MMOL/L (ref 98–107)
CLARITY UR: CLEAR
CO2 SERPL-SCNC: 25.3 MMOL/L (ref 22–29)
COLOR UR: YELLOW
CREAT SERPL-MCNC: 1.28 MG/DL (ref 0.57–1)
DEPRECATED RDW RBC AUTO: 41.1 FL (ref 37–54)
EGFRCR SERPLBLD CKD-EPI 2021: 44.1 ML/MIN/1.73
EOSINOPHIL # BLD AUTO: 0.12 10*3/MM3 (ref 0–0.4)
EOSINOPHIL NFR BLD AUTO: 2.1 % (ref 0.3–6.2)
ERYTHROCYTE [DISTWIDTH] IN BLOOD BY AUTOMATED COUNT: 11.9 % (ref 12.3–15.4)
GLUCOSE SERPL-MCNC: 125 MG/DL (ref 65–99)
GLUCOSE UR STRIP-MCNC: NEGATIVE MG/DL
HBA1C MFR BLD: 5.6 % (ref 3.5–5.6)
HCT VFR BLD AUTO: 40.3 % (ref 34–46.6)
HGB BLD-MCNC: 13.5 G/DL (ref 12–15.9)
HGB UR QL STRIP.AUTO: NEGATIVE
HYALINE CASTS UR QL AUTO: NORMAL /LPF
IMM GRANULOCYTES # BLD AUTO: 0.01 10*3/MM3 (ref 0–0.05)
IMM GRANULOCYTES NFR BLD AUTO: 0.2 % (ref 0–0.5)
KETONES UR QL STRIP: NEGATIVE
LEUKOCYTE ESTERASE UR QL STRIP.AUTO: NEGATIVE
LYMPHOCYTES # BLD AUTO: 2.53 10*3/MM3 (ref 0.7–3.1)
LYMPHOCYTES NFR BLD AUTO: 43.7 % (ref 19.6–45.3)
MAGNESIUM SERPL-MCNC: 2 MG/DL (ref 1.6–2.4)
MCH RBC QN AUTO: 31.8 PG (ref 26.6–33)
MCHC RBC AUTO-ENTMCNC: 33.5 G/DL (ref 31.5–35.7)
MCV RBC AUTO: 94.8 FL (ref 79–97)
MONOCYTES # BLD AUTO: 0.33 10*3/MM3 (ref 0.1–0.9)
MONOCYTES NFR BLD AUTO: 5.7 % (ref 5–12)
NEUTROPHILS NFR BLD AUTO: 2.76 10*3/MM3 (ref 1.7–7)
NEUTROPHILS NFR BLD AUTO: 47.6 % (ref 42.7–76)
NITRITE UR QL STRIP: NEGATIVE
NRBC BLD AUTO-RTO: 0 /100 WBC (ref 0–0.2)
PH UR STRIP.AUTO: 6.5 [PH] (ref 5–8)
PHOSPHATE SERPL-MCNC: 3.5 MG/DL (ref 2.5–4.5)
PLATELET # BLD AUTO: 228 10*3/MM3 (ref 140–450)
PMV BLD AUTO: 11.4 FL (ref 6–12)
POTASSIUM SERPL-SCNC: 5.4 MMOL/L (ref 3.5–5.2)
PROT UR QL STRIP: ABNORMAL
PTH-INTACT SERPL-MCNC: 44 PG/ML (ref 15–65)
RBC # BLD AUTO: 4.25 10*6/MM3 (ref 3.77–5.28)
RBC # UR STRIP: NORMAL /HPF
REF LAB TEST METHOD: NORMAL
SODIUM SERPL-SCNC: 136 MMOL/L (ref 136–145)
SP GR UR STRIP: 1.01 (ref 1–1.03)
SQUAMOUS #/AREA URNS HPF: NORMAL /HPF
URATE SERPL-MCNC: 5.2 MG/DL (ref 2.4–5.7)
UROBILINOGEN UR QL STRIP: ABNORMAL
WBC # UR STRIP: NORMAL /HPF
WBC NRBC COR # BLD: 5.79 10*3/MM3 (ref 3.4–10.8)

## 2022-10-13 PROCEDURE — 85025 COMPLETE CBC W/AUTO DIFF WBC: CPT

## 2022-10-13 PROCEDURE — 83735 ASSAY OF MAGNESIUM: CPT

## 2022-10-13 PROCEDURE — 80048 BASIC METABOLIC PNL TOTAL CA: CPT

## 2022-10-13 PROCEDURE — 82306 VITAMIN D 25 HYDROXY: CPT

## 2022-10-13 PROCEDURE — 84100 ASSAY OF PHOSPHORUS: CPT

## 2022-10-13 PROCEDURE — 81001 URINALYSIS AUTO W/SCOPE: CPT

## 2022-10-13 PROCEDURE — 36415 COLL VENOUS BLD VENIPUNCTURE: CPT

## 2022-10-13 PROCEDURE — 83970 ASSAY OF PARATHORMONE: CPT

## 2022-10-13 PROCEDURE — 83036 HEMOGLOBIN GLYCOSYLATED A1C: CPT

## 2022-10-13 PROCEDURE — 84550 ASSAY OF BLOOD/URIC ACID: CPT

## 2022-12-19 ENCOUNTER — TELEPHONE (OUTPATIENT)
Dept: CARDIOLOGY | Facility: CLINIC | Age: 72
End: 2022-12-19

## 2022-12-19 NOTE — TELEPHONE ENCOUNTER
Caller: Kathy Wu    Relationship: Self      Best call back number: [phone]    What is the best time to reach you: ANY    Who are you requesting to speak with (clinical staff, provider,  specific staff member): ANY      What was the call regarding: PT HAS A RESCHEDULE FOR DR. TAPIA, HIS INITIAL APPT IS 12.22.22, FIRST AVAILABLE FOR HIS 6 MONTH F.U ISN'T UNTIL April. PLEASE CALL PT FOR BETTER AVAILABILITY.     Do you require a callback: YES

## 2023-01-23 ENCOUNTER — OFFICE VISIT (OUTPATIENT)
Dept: CARDIOLOGY | Facility: CLINIC | Age: 73
End: 2023-01-23
Payer: MEDICARE

## 2023-01-23 VITALS
HEIGHT: 66 IN | DIASTOLIC BLOOD PRESSURE: 76 MMHG | BODY MASS INDEX: 27.16 KG/M2 | SYSTOLIC BLOOD PRESSURE: 132 MMHG | HEART RATE: 86 BPM | OXYGEN SATURATION: 98 % | WEIGHT: 169 LBS

## 2023-01-23 DIAGNOSIS — E11.9 TYPE 2 DIABETES MELLITUS WITHOUT COMPLICATION, WITHOUT LONG-TERM CURRENT USE OF INSULIN: ICD-10-CM

## 2023-01-23 DIAGNOSIS — J44.9 CHRONIC OBSTRUCTIVE PULMONARY DISEASE, UNSPECIFIED COPD TYPE: ICD-10-CM

## 2023-01-23 DIAGNOSIS — I25.118 CORONARY ARTERY DISEASE OF NATIVE ARTERY OF NATIVE HEART WITH STABLE ANGINA PECTORIS: Primary | ICD-10-CM

## 2023-01-23 DIAGNOSIS — I10 ESSENTIAL HYPERTENSION: ICD-10-CM

## 2023-01-23 DIAGNOSIS — E78.00 PURE HYPERCHOLESTEROLEMIA: ICD-10-CM

## 2023-01-23 PROCEDURE — 99214 OFFICE O/P EST MOD 30 MIN: CPT | Performed by: INTERNAL MEDICINE

## 2023-01-23 NOTE — PROGRESS NOTES
Subjective:     Encounter Date:01/23/2023      Patient ID: Kathy Wu is a 73 y.o. female.    Chief Complaint:  History of Present Illness 73-year-old white female with history of coronary disease hypertension hyperlipidemia diabetes COPD presents to my office for a follow-up.  Patient is currently stable without any symptoms of chest pain or shortness of breath at rest on exertion.  No complains of any PND orthopnea.  No palpitation dizziness syncope or swelling of the feet.  Patient has been taking all meds regular.  Patient still continues to smoke.    The following portions of the patient's history were reviewed and updated as appropriate: allergies, current medications, past family history, past medical history, past social history, past surgical history and problem list.  Past Medical History:   Diagnosis Date   • 3-vessel CAD 09/06/2018    Severe on Cardiac Cath; Daily Aspirin--S/p CABG   • Abnormal EKG 09/04/2018   • Anxiety     Xanax   • Breast pain, left 09/06/2018    Per Pt   • Cataract    • Chest pain 09/2018   • Cholecystitis 07/2007    S/p Cholecystectomy   • Cholecystolithiasis 07/2007    S/p Cholecystectomy   • Colitis 2007   • COPD (chronic obstructive pulmonary disease) ([facility])    • Diverticulosis 2007   • DM (diabetes mellitus) ([facility]) Since 1948    T2--Metformin Currently   • Dyslipidemia     Controlled w/Meds   • Family history of heart disease     Mother & Father Both Had MI's   • Gastritis 2007   • History of bone density study 07/31/2008-[facility]   • Hx of chest x-ray 09/4/18-[facility]    WNL w/Calcified Atherosclerotic Disease in Thoracic Aorta   • Hx of CT scan of chest 09/4/18-[facility]    Normal Findings with Degenerative Changes of Spine; No Evidence of Pulmonary Embolous Seen; 2.1CM Thyroid Nodule Noted   • Hypertension Since 1948    Controlled w/Meds   • Kidney disease, chronic, stage III (GFR 30-59 ml/min) ([facility])    • Nasal congestion 12/2016    Treated @ [facility] w/Antibiotics  "  • Postmenopausal    • Renal disease    • Ringing of ears, bilateral    • SOB (shortness of breath)     Chronic   • Stress    • Tobacco use     1 PPD     Past Surgical History:   Procedure Laterality Date   • BREAST SURGERY Right     biopsy   • CARDIAC CATHETERIZATION  18-[facility]    Dr. Hurtado--Severe L Main 3-V CAD   • CARDIAC SURGERY     • CHOLECYSTECTOMY  2007    Lap--Dusty Hernandez MD   • COLONOSCOPY     • COLONOSCOPY N/A 2019    Procedure: COLONOSCOPY with polypectomy X2;  Surgeon: Jazz More MD;  Location: [facility] ENDOSCOPY;  Service: Gastroenterology   • CORONARY ARTERY BYPASS GRAFT  18-[facility]    Urgent x 4 with Left Saphenous Vein Grafting-Dr. Palmer   • ENDOSCOPIC VEIN HARVEST  18-[facility]    Of R Lower Extremity-Dr. Palmer   • ENDOSCOPY  07-[facility]    w/EGD-Erich Wasserman MD   • HAND SURGERY     • LAPAROSCOPIC TUBAL LIGATION       /76   Pulse 86   Ht 168.9 cm (66.5\")   Wt 76.7 kg (169 lb)   SpO2 98%   BMI 26.87 kg/m²   Family History   Problem Relation Age of Onset   • Heart attack Mother    • Coronary artery disease Mother         Had CABG   • Heart attack Father          Age 63       Current Outpatient Medications:   •  Alpha-Lipoic Acid 300 MG tablet, Take  by mouth., Disp: , Rfl:   •  ALPRAZolam (XANAX) 1 MG tablet, Take 1 mg by mouth 4 (Four) Times a Day., Disp: , Rfl:   •  aspirin 325 MG EC tablet, Take 325 mg by mouth Daily., Disp: , Rfl:   •  atorvastatin (LIPITOR) 40 MG tablet, Take 40 mg by mouth Daily., Disp: , Rfl:   •  busPIRone (BUSPAR) 10 MG tablet, Take 10 mg by mouth 2 (Two) Times a Day., Disp: , Rfl:   •  calcium carbonate (TUMS) 500 MG chewable tablet, Chew 1 tablet 3 (Three) Times a Day As Needed for Indigestion or Heartburn., Disp: , Rfl:   •  furosemide (LASIX) 20 MG tablet, Take 20 mg by mouth 2 (Two) Times a Week. Monday and Friday, Disp: , Rfl:   •  ipratropium-albuterol (DUO-NEB) 0.5-2.5 mg/3 ml nebulizer, Take 3 mL by " nebulization 4 (Four) Times a Day., Disp: , Rfl:   •  losartan (COZAAR) 50 MG tablet, Take 50 mg by mouth Daily., Disp: , Rfl:   •  magnesium oxide (MAGOX) 400 (241.3 Mg) MG tablet tablet, Take 400 mg by mouth Daily., Disp: , Rfl:   •  metFORMIN (GLUCOPHAGE) 500 MG tablet, Take 500 mg by mouth 2 (Two) Times a Day With Meals., Disp: , Rfl:   •  nateglinide (STARLIX) 120 MG tablet, Take 120 mg by mouth 2 (Two) Times a Day., Disp: , Rfl:   •  Omega-3 Fatty Acids (fish oil) 1000 MG capsule capsule, Take  by mouth 2 (Two) Times a Day With Meals., Disp: , Rfl:   •  vitamin D (ERGOCALCIFEROL) 1.25 MG (85335 UT) capsule capsule, Take 50,000 Units by mouth 1 (One) Time Per Week., Disp: , Rfl:   Allergies   Allergen Reactions   • Codeine Unknown (See Comments)     Unknown     Social History     Socioeconomic History   • Marital status:      Spouse name: Benji   Tobacco Use   • Smoking status: Every Day     Packs/day: 1.00     Types: Cigarettes     Last attempt to quit: 2018     Years since quittin.3   • Smokeless tobacco: Never   Vaping Use   • Vaping Use: Never used   Substance and Sexual Activity   • Alcohol use: No   • Drug use: No   • Sexual activity: Defer     Birth control/protection: Post-menopausal, Surgical     Comment: TL     Review of Systems   Constitutional: Negative for malaise/fatigue.   Cardiovascular: Negative for chest pain, dyspnea on exertion, leg swelling and palpitations.   Respiratory: Negative for cough and shortness of breath.    Gastrointestinal: Negative for abdominal pain, nausea and vomiting.   Neurological: Negative for dizziness, focal weakness, headaches, light-headedness and numbness.   All other systems reviewed and are negative.             Objective:     Constitutional:       Appearance: Well-developed.   Eyes:      General: No scleral icterus.     Conjunctiva/sclera: Conjunctivae normal.   HENT:      Head: Normocephalic and atraumatic.   Neck:      Vascular: No carotid bruit  or JVD.   Pulmonary:      Effort: Pulmonary effort is normal.      Breath sounds: Normal breath sounds. No wheezing. No rales.   Cardiovascular:      Normal rate. Regular rhythm.   Pulses:     Intact distal pulses.   Abdominal:      General: Bowel sounds are normal.      Palpations: Abdomen is soft.   Musculoskeletal:      Cervical back: Normal range of motion and neck supple. Skin:     General: Skin is warm and dry.      Findings: No rash.   Neurological:      Mental Status: Alert.       Procedures    Lab Review:         MDM  1.  Coronary disease  Patient had coronary bypass surgery x4 vessels with a LIMA to LAD and saphenous graft to the diagonal branch marginal branch and PDA and has normal V function is currently stable on medications  2.  Hypertension  Patient blood pressure currently stable on medical therapy with losartan  3.  Hyperlipidemia  Pains on Lipitor and the lipid levels are well within normal limits  4.  Diabetes  Patient is on oral medical therapy therapy and followed by the primary care doctor.      Patient's previous medical records, labs, and EKG were reviewed and discussed with the patient at today's visit.

## 2023-03-08 ENCOUNTER — HOSPITAL ENCOUNTER (EMERGENCY)
Facility: HOSPITAL | Age: 73
Discharge: HOME | End: 2023-03-09
Attending: EMERGENCY MEDICINE | Admitting: EMERGENCY MEDICINE
Payer: MEDICARE

## 2023-03-08 ENCOUNTER — APPOINTMENT (OUTPATIENT)
Dept: CT IMAGING | Facility: HOSPITAL | Age: 73
End: 2023-03-08
Payer: MEDICARE

## 2023-03-08 ENCOUNTER — APPOINTMENT (OUTPATIENT)
Dept: ULTRASOUND IMAGING | Facility: HOSPITAL | Age: 73
End: 2023-03-08
Payer: MEDICARE

## 2023-03-08 DIAGNOSIS — R93.89 ABNORMAL PELVIC ULTRASOUND: ICD-10-CM

## 2023-03-08 DIAGNOSIS — N93.9 ABNORMAL UTERINE BLEEDING: Primary | ICD-10-CM

## 2023-03-08 DIAGNOSIS — R10.2 PELVIC PAIN: ICD-10-CM

## 2023-03-08 LAB
ALBUMIN SERPL-MCNC: 3.8 G/DL (ref 3.5–5.2)
ALBUMIN/GLOB SERPL: 1.1 G/DL
ALP SERPL-CCNC: 69 U/L (ref 39–117)
ALT SERPL W P-5'-P-CCNC: 19 U/L (ref 1–33)
ANION GAP SERPL CALCULATED.3IONS-SCNC: 10 MMOL/L (ref 5–15)
AST SERPL-CCNC: 22 U/L (ref 1–32)
BACTERIA UR QL AUTO: ABNORMAL /HPF
BASOPHILS # BLD AUTO: 0.1 10*3/MM3 (ref 0–0.2)
BASOPHILS NFR BLD AUTO: 0.7 % (ref 0–1.5)
BILIRUB SERPL-MCNC: 0.2 MG/DL (ref 0–1.2)
BILIRUB UR QL STRIP: NEGATIVE
BUN SERPL-MCNC: 25 MG/DL (ref 8–23)
BUN/CREAT SERPL: 19.5 (ref 7–25)
CALCIUM SPEC-SCNC: 9 MG/DL (ref 8.6–10.5)
CHLORIDE SERPL-SCNC: 100 MMOL/L (ref 98–107)
CLARITY UR: CLEAR
CO2 SERPL-SCNC: 27 MMOL/L (ref 22–29)
COLOR UR: YELLOW
CREAT SERPL-MCNC: 1.28 MG/DL (ref 0.57–1)
DEPRECATED RDW RBC AUTO: 46.8 FL (ref 37–54)
EGFRCR SERPLBLD CKD-EPI 2021: 43.8 ML/MIN/1.73
EOSINOPHIL # BLD AUTO: 0.1 10*3/MM3 (ref 0–0.4)
EOSINOPHIL NFR BLD AUTO: 1.4 % (ref 0.3–6.2)
ERYTHROCYTE [DISTWIDTH] IN BLOOD BY AUTOMATED COUNT: 13.8 % (ref 12.3–15.4)
GLOBULIN UR ELPH-MCNC: 3.4 GM/DL
GLUCOSE SERPL-MCNC: 200 MG/DL (ref 65–99)
GLUCOSE UR STRIP-MCNC: NEGATIVE MG/DL
HCT VFR BLD AUTO: 40.4 % (ref 34–46.6)
HGB BLD-MCNC: 13.2 G/DL (ref 12–15.9)
HGB UR QL STRIP.AUTO: NEGATIVE
HYALINE CASTS UR QL AUTO: ABNORMAL /LPF
KETONES UR QL STRIP: NEGATIVE
LEUKOCYTE ESTERASE UR QL STRIP.AUTO: NEGATIVE
LIPASE SERPL-CCNC: 101 U/L (ref 13–60)
LYMPHOCYTES # BLD AUTO: 2.5 10*3/MM3 (ref 0.7–3.1)
LYMPHOCYTES NFR BLD AUTO: 29.3 % (ref 19.6–45.3)
MAGNESIUM SERPL-MCNC: 2.2 MG/DL (ref 1.6–2.4)
MCH RBC QN AUTO: 31.9 PG (ref 26.6–33)
MCHC RBC AUTO-ENTMCNC: 32.6 G/DL (ref 31.5–35.7)
MCV RBC AUTO: 97.8 FL (ref 79–97)
MONOCYTES # BLD AUTO: 0.5 10*3/MM3 (ref 0.1–0.9)
MONOCYTES NFR BLD AUTO: 6.3 % (ref 5–12)
NEUTROPHILS NFR BLD AUTO: 5.4 10*3/MM3 (ref 1.7–7)
NEUTROPHILS NFR BLD AUTO: 62.3 % (ref 42.7–76)
NITRITE UR QL STRIP: NEGATIVE
NRBC BLD AUTO-RTO: 0 /100 WBC (ref 0–0.2)
PH UR STRIP.AUTO: 7.5 [PH] (ref 5–8)
PLATELET # BLD AUTO: 239 10*3/MM3 (ref 140–450)
PMV BLD AUTO: 8.8 FL (ref 6–12)
POTASSIUM SERPL-SCNC: 5.2 MMOL/L (ref 3.5–5.2)
PROT SERPL-MCNC: 7.2 G/DL (ref 6–8.5)
PROT UR QL STRIP: ABNORMAL
RBC # BLD AUTO: 4.12 10*6/MM3 (ref 3.77–5.28)
RBC # UR STRIP: ABNORMAL /HPF
REF LAB TEST METHOD: ABNORMAL
SODIUM SERPL-SCNC: 137 MMOL/L (ref 136–145)
SP GR UR STRIP: 1.01 (ref 1–1.03)
SQUAMOUS #/AREA URNS HPF: ABNORMAL /HPF
UROBILINOGEN UR QL STRIP: ABNORMAL
WBC # UR STRIP: ABNORMAL /HPF
WBC NRBC COR # BLD: 8.7 10*3/MM3 (ref 3.4–10.8)

## 2023-03-08 PROCEDURE — 76830 TRANSVAGINAL US NON-OB: CPT

## 2023-03-08 PROCEDURE — 76856 US EXAM PELVIC COMPLETE: CPT

## 2023-03-08 PROCEDURE — 74177 CT ABD & PELVIS W/CONTRAST: CPT

## 2023-03-08 PROCEDURE — 85025 COMPLETE CBC W/AUTO DIFF WBC: CPT

## 2023-03-08 PROCEDURE — 80053 COMPREHEN METABOLIC PANEL: CPT

## 2023-03-08 PROCEDURE — 81001 URINALYSIS AUTO W/SCOPE: CPT

## 2023-03-08 PROCEDURE — 83735 ASSAY OF MAGNESIUM: CPT

## 2023-03-08 PROCEDURE — 25510000001 IOPAMIDOL PER 1 ML: Performed by: EMERGENCY MEDICINE

## 2023-03-08 PROCEDURE — 93976 VASCULAR STUDY: CPT

## 2023-03-08 PROCEDURE — 99283 EMERGENCY DEPT VISIT LOW MDM: CPT

## 2023-03-08 PROCEDURE — 83690 ASSAY OF LIPASE: CPT

## 2023-03-08 PROCEDURE — P9612 CATHETERIZE FOR URINE SPEC: HCPCS

## 2023-03-08 RX ORDER — HYDROCODONE BITARTRATE AND ACETAMINOPHEN 5; 325 MG/1; MG/1
1 TABLET ORAL ONCE AS NEEDED
Status: COMPLETED | OUTPATIENT
Start: 2023-03-08 | End: 2023-03-09

## 2023-03-08 RX ORDER — SODIUM CHLORIDE 0.9 % (FLUSH) 0.9 %
10 SYRINGE (ML) INJECTION AS NEEDED
Status: DISCONTINUED | OUTPATIENT
Start: 2023-03-08 | End: 2023-03-09 | Stop reason: HOSPADM

## 2023-03-08 RX ADMIN — IOPAMIDOL 100 ML: 755 INJECTION, SOLUTION INTRAVENOUS at 20:57

## 2023-03-08 NOTE — ED TRIAGE NOTES
Pt arrived via PV c/o bleeding after urinating. Pt states it is coming from her vagina. Pt states symptoms started this morning and thought she had a yeast infection.

## 2023-03-08 NOTE — ED PROVIDER NOTES
Subjective      Provider in Triage Note  Pt has thought she had a yeast infection and treated it but wiped and noticed blood on the tissue, which she believes is coming from her vagina but unsure. She c/o groin pain that worsened since she wiped. Denies dysuria, fever, CP, SOA, dizziness.       History of Present Illness  Patient is a 73-year-old white female who presents today with complaints of vaginal bleeding and pelvic discomfort.  She states that started today.  She states she went to urinate and after she wiped she noticed blood on the tissue.  She also states she is having some discomfort in her low pelvic area.  She denies any dysuria frequency urgency.  She denies fever chills nausea vomiting or change in bowel.  She denies blood thinner use.        Review of Systems   Constitutional: Negative for fever.   Gastrointestinal: Negative for nausea and vomiting.   Genitourinary: Positive for pelvic pain and vaginal bleeding. Negative for dysuria, flank pain, frequency and urgency.       Past Medical History:   Diagnosis Date   • 3-vessel CAD 09/06/2018    Severe on Cardiac Cath; Daily Aspirin--S/p CABG   • Abnormal EKG 09/04/2018   • Anxiety     Xanax   • Breast pain, left 09/06/2018    Per Pt   • Cataract    • Chest pain 09/2018   • Cholecystitis 07/2007    S/p Cholecystectomy   • Cholecystolithiasis 07/2007    S/p Cholecystectomy   • Colitis 2007   • COPD (chronic obstructive pulmonary disease) ([facility])    • Diverticulosis 2007   • DM (diabetes mellitus) ([facility]) Since 1948    T2--Metformin Currently   • Dyslipidemia     Controlled w/Meds   • Family history of heart disease     Mother & Father Both Had MI's   • Gastritis 2007   • History of bone density study 07/31/2008-[facility]    NL   • Hx of chest x-ray 09/4/18-[facility]    WNL w/Calcified Atherosclerotic Disease in Thoracic Aorta   • Hx of CT scan of chest 09/4/18-[facility]    Normal Findings with Degenerative Changes of Spine; No Evidence of Pulmonary  Embolous Seen; 2.1CM Thyroid Nodule Noted   • Hypertension Since 1948    Controlled w/Meds   • Kidney disease, chronic, stage III (GFR 30-59 ml/min) ([facility])    • Nasal congestion 2016    Treated @ [facility] w/Antibiotics   • Postmenopausal    • Renal disease    • Ringing of ears, bilateral    • SOB (shortness of breath)     Chronic   • Stress    • Tobacco use     1 PPD       Allergies   Allergen Reactions   • Codeine Unknown (See Comments)     Unknown       Past Surgical History:   Procedure Laterality Date   • BREAST SURGERY Right     biopsy   • CARDIAC CATHETERIZATION  18-[facility]    Dr. Hurtado--Severe L Main 3-V CAD   • CARDIAC SURGERY     • CHOLECYSTECTOMY  2007    Lap--Dusty Hernandez MD   • COLONOSCOPY     • COLONOSCOPY N/A 2019    Procedure: COLONOSCOPY with polypectomy X2;  Surgeon: Jazz More MD;  Location: [facility] ENDOSCOPY;  Service: Gastroenterology   • CORONARY ARTERY BYPASS GRAFT  18-[facility]    Urgent x 4 with Left Saphenous Vein Grafting-Dr. Palmer   • ENDOSCOPIC VEIN HARVEST  18-[facility]    Of R Lower Extremity-Dr. Palmer   • ENDOSCOPY  07-[facility]    w/EGD-Erich Wasserman MD   • HAND SURGERY     • LAPAROSCOPIC TUBAL LIGATION         Family History   Problem Relation Age of Onset   • Heart attack Mother    • Coronary artery disease Mother         Had CABG   • Heart attack Father          Age 63       Social History     Socioeconomic History   • Marital status:      Spouse name: Benji   Tobacco Use   • Smoking status: Every Day     Packs/day: 1.00     Types: Cigarettes     Last attempt to quit: 2018     Years since quittin.5   • Smokeless tobacco: Never   Vaping Use   • Vaping Use: Never used   Substance and Sexual Activity   • Alcohol use: No   • Drug use: No   • Sexual activity: Defer     Birth control/protection: Post-menopausal, Surgical     Comment: TL           Objective   Physical Exam  Vital signs and triage nurse note reviewed.  Constitutional: Awake,  alert; well-developed and well-nourished. No acute distress is noted.  HEENT: Normocephalic, atraumatic; pupils are PERRL with intact EOM; oropharynx is pink and moist without exudate or erythema.  No drooling or pooling of oral secretions.  Neck: Supple, full range of motion without pain; no cervical lymphadenopathy. Normal phonation.  Cardiovascular: Regular rate and rhythm, normal S1-S2.  No murmur noted.  Pulmonary: Respiratory effort regular nonlabored, breath sounds clear to auscultation all fields.  Abdomen: Soft, mildly ten over the lower abdomen and pelvis tawanda, nondistended with normoactive bowel sounds; no rebound or guarding.  Musculoskeletal: Independent range of motion of all extremities with no palpable tenderness or edema.  Neuro: Alert oriented x3, speech is clear and appropriate, GCS 15.    Skin: Flesh tone, warm, dry, intact; no erythematous or petechial rash or lesion.    The patient was placed in lithotomy position. External genitalia were normal. There was no evidence of rash, external lesions or abscesses. Speculum was inserted. Posterior vaginal vault was examined.    There was small amount dark blood in the posterior vaginal vault.      Procedures           ED Course      Labs Reviewed   COMPREHENSIVE METABOLIC PANEL - Abnormal; Notable for the following components:       Result Value    Glucose 200 (*)     BUN 25 (*)     Creatinine 1.28 (*)     eGFR 43.8 (*)     All other components within normal limits    Narrative:     GFR Normal >60  Chronic Kidney Disease <60  Kidney Failure <15    The GFR formula is only valid for adults with stable renal function between ages 18 and 70.   URINALYSIS W/ MICROSCOPIC IF INDICATED (NO CULTURE) - Abnormal; Notable for the following components:    Protein, UA 30 mg/dL (1+) (*)     All other components within normal limits   LIPASE - Abnormal; Notable for the following components:    Lipase 101 (*)     All other components within normal limits   CBC WITH AUTO  DIFFERENTIAL - Abnormal; Notable for the following components:    MCV 97.8 (*)     All other components within normal limits   URINALYSIS, MICROSCOPIC ONLY - Abnormal; Notable for the following components:    RBC, UA 0-2 (*)     All other components within normal limits   MAGNESIUM - Normal   CBC AND DIFFERENTIAL    Narrative:     The following orders were created for panel order CBC & Differential.  Procedure                               Abnormality         Status                     ---------                               -----------         ------                     CBC Auto Differential[[id number]]        Abnormal            Final result                 Please view results for these tests on the individual orders.     US Non-ob Transvaginal    Result Date: 3/8/2023  1.Large complex exophytic mass arising from left ovary measuring 7.5 x 6.5 x 8.6 cm. This corresponds to findings seen on abdomen CT from the same day. This complex mass is again concerning for an ovarian malignancy. Nonemergent MRI can be obtained for further evaluation. GYN consultation is recommended. 2.Complex fluid/blood products/soft tissue mass (measuring 3.5 x 3.2 x 3.3 cm) within lower uterine segment endometrial cavity/cervix. Thickened and heterogeneous endometrium. This finding is also concerning for endometrial malignancy. Nonemergent MRI can be obtained for further evaluation. GYN consultation is recommended. 3.Right ovary is not visualized.         Electronically Signed: Miguel A Ramsey  3/8/2023 10:46 PM EST  Workstation ID: [id number]    US Pelvis Complete    Result Date: 3/8/2023  1.Large complex exophytic mass arising from left ovary measuring 7.5 x 6.5 x 8.6 cm. This corresponds to findings seen on abdomen CT from the same day. This complex mass is again concerning for an ovarian malignancy. Nonemergent MRI can be obtained for further evaluation. GYN consultation is recommended. 2.Complex fluid/blood products/soft tissue mass  (measuring 3.5 x 3.2 x 3.3 cm) within lower uterine segment endometrial cavity/cervix. Thickened and heterogeneous endometrium. This finding is also concerning for endometrial malignancy. Nonemergent MRI can be obtained for further evaluation. GYN consultation is recommended. 3.Right ovary is not visualized.         Electronically Signed: Miguel A Ramsey  3/8/2023 10:46 PM EST  Workstation ID: [id number]    CT Abdomen Pelvis With Contrast    Result Date: 3/8/2023  1.Large left ovarian cystic lesion measuring 9.5 x 5.9 x 7.7 cm. This is an abnormal finding in a postmenopausal female and concerning for ovarian malignancy. Nonemergent pelvic MRI could be performed for further evaluation. 2.Dilated endometrial cavity measuring 3.9 cm in maximum dimension containing complex fluid. This is an abnormal finding in a postmenopausal female and may represent endometrial malignancy. Nonemergent MRI pelvis can be performed for further evaluation. 3.Diffuse esophageal wall thickening may represent esophagitis or other etiologies. Follow-up recommended. 4.1.6 cm hyperdense lesion in right kidney that is not a simple cyst. Nonemergent MRI can be performed for further evaluation. 5.Diverticulosis without diverticulitis. Electronically Signed: Miguel A Ali  3/8/2023 9:20 PM EST  Workstation ID: [id number]    Medications   sodium chloride 0.9 % flush 10 mL (has no administration in time range)   iopamidol (ISOVUE-370) 76 % injection 100 mL (100 mL Intravenous Given 3/8/23 2057)   HYDROcodone-acetaminophen (NORCO) 5-325 MG per tablet 1 tablet (1 tablet Oral Given 3/9/23 0013)                                          Medical Decision Making  Patient presents today with complaints of sudden onset of vaginal bleeding and pelvic discomfort.  Denies injury.  Denies blood thinner use.  No fever dysuria or other symptoms.    She had above exam evaluation.  IV was established.  Labs and CT were obtained as well as pelvic ultrasound.    Work-up: CBC  is unremarkable with stable hemoglobin of 13.2.  Metabolic panel significant for creatinine of 1.2 which appears stable from prior.  Urinalysis shows no blood or sign of infection.  CT shows a large left ovarian cystic lesion.  Pelvic ultrasound reveals a Large complex exophytic mass arising from left ovary measuring 7.5 x 6.5 x 8.6 cm. This corresponds to findings seen on abdomen CT from the same day. This complex mass is again concerning for an ovarian malignancy. Nonemergent MRI can be obtained for   further evaluation. GYN consultation is recommended.  2.Complex fluid/blood products/soft tissue mass (measuring 3.5 x 3.2 x 3.3 cm) within lower uterine segment endometrial cavity/cervix. Thickened and heterogeneous endometrium. This finding is also concerning for endometrial malignancy. Nonemergent MRI   can be obtained for further evaluation. GYN consultation is recommended.  3.Right ovary is not visualized.    Patient was given Norco for pain.    Case was discussed with on-call OB/GYN, Dr. Cleaning.  Patient will be discharged home with close outpatient follow-up.  She would likely need referral to Gyn Onc.    Patient is otherwise remained hemodynamically stable.  She is in no distress.    Diagnosis and treatment plan discussed with patient.  Patient agreeable to plan.   I discussed findings with patient who voices understanding of discharge instructions, signs and symptoms requiring return to ED; discharged improved and in stable condition with follow up for re-evaluation.  Prescription for Norco.  Inspect reviewed.    Abnormal pelvic ultrasound: acute illness or injury  Abnormal uterine bleeding: acute illness or injury  Pelvic pain: acute illness or injury  Amount and/or Complexity of Data Reviewed  Labs: ordered.  Radiology: ordered.      Risk  Prescription drug management.          Final diagnoses:   Abnormal uterine bleeding   Abnormal pelvic ultrasound   Pelvic pain       ED Disposition  ED Disposition     ED  Disposition   Discharge    Condition   Stable    Comment   --             Elaina Cleaning MD  [address] [facility] [address]  [place] [address]  [phone]    Schedule an appointment as soon as possible for a visit       Marky Ritchie MD  [facility] [facility] [address]  [place] [address]  [phone]          Renard Escalona MD  [address] [place] [address]  [phone]    Schedule an appointment as soon as possible for a visit            Medication List      New Prescriptions    HYDROcodone-acetaminophen 5-325 MG per tablet  Commonly known as: NORCO  Take 1 tablet by mouth Every 6 (Six) Hours As Needed for Moderate Pain.           Where to Get Your Medications      These medications were sent to [facility] [id number] - [place], [place] - [address] AT [facility] [address] - [phone] PH - [phone] FX  [address] [place] [address]    Phone: [phone]   · HYDROcodone-acetaminophen 5-325 MG per tablet          Polina Ware, DAVID  03/09/23 0056

## 2023-03-09 VITALS
TEMPERATURE: 98 F | RESPIRATION RATE: 16 BRPM | SYSTOLIC BLOOD PRESSURE: 146 MMHG | HEART RATE: 69 BPM | DIASTOLIC BLOOD PRESSURE: 85 MMHG | WEIGHT: 171 LBS | BODY MASS INDEX: 27.48 KG/M2 | HEIGHT: 66 IN | OXYGEN SATURATION: 100 %

## 2023-03-09 VITALS
HEART RATE: 66 BPM | TEMPERATURE: 98.3 F | OXYGEN SATURATION: 98 % | HEIGHT: 66 IN | WEIGHT: 171.3 LBS | DIASTOLIC BLOOD PRESSURE: 65 MMHG | BODY MASS INDEX: 27.53 KG/M2 | RESPIRATION RATE: 16 BRPM | SYSTOLIC BLOOD PRESSURE: 171 MMHG

## 2023-03-09 DIAGNOSIS — N93.9 ABNORMAL UTERINE BLEEDING: Primary | ICD-10-CM

## 2023-03-09 DIAGNOSIS — R10.2 PELVIC PAIN: ICD-10-CM

## 2023-03-09 LAB
ANION GAP SERPL CALCULATED.3IONS-SCNC: 11 MMOL/L (ref 5–15)
BASOPHILS # BLD AUTO: 0.1 10*3/MM3 (ref 0–0.2)
BASOPHILS NFR BLD AUTO: 0.6 % (ref 0–1.5)
BUN SERPL-MCNC: 20 MG/DL (ref 8–23)
BUN/CREAT SERPL: 16.3 (ref 7–25)
CALCIUM SPEC-SCNC: 9 MG/DL (ref 8.6–10.5)
CHLORIDE SERPL-SCNC: 102 MMOL/L (ref 98–107)
CO2 SERPL-SCNC: 25 MMOL/L (ref 22–29)
CREAT SERPL-MCNC: 1.23 MG/DL (ref 0.57–1)
DEPRECATED RDW RBC AUTO: 48.1 FL (ref 37–54)
EGFRCR SERPLBLD CKD-EPI 2021: 45.9 ML/MIN/1.73
EOSINOPHIL # BLD AUTO: 0.1 10*3/MM3 (ref 0–0.4)
EOSINOPHIL NFR BLD AUTO: 1 % (ref 0.3–6.2)
ERYTHROCYTE [DISTWIDTH] IN BLOOD BY AUTOMATED COUNT: 14 % (ref 12.3–15.4)
GLUCOSE SERPL-MCNC: 148 MG/DL (ref 65–99)
HCT VFR BLD AUTO: 42.7 % (ref 34–46.6)
HGB BLD-MCNC: 13.8 G/DL (ref 12–15.9)
LYMPHOCYTES # BLD AUTO: 1.3 10*3/MM3 (ref 0.7–3.1)
LYMPHOCYTES NFR BLD AUTO: 14.6 % (ref 19.6–45.3)
MCH RBC QN AUTO: 31.6 PG (ref 26.6–33)
MCHC RBC AUTO-ENTMCNC: 32.3 G/DL (ref 31.5–35.7)
MCV RBC AUTO: 97.9 FL (ref 79–97)
MONOCYTES # BLD AUTO: 0.5 10*3/MM3 (ref 0.1–0.9)
MONOCYTES NFR BLD AUTO: 5.3 % (ref 5–12)
NEUTROPHILS NFR BLD AUTO: 7.2 10*3/MM3 (ref 1.7–7)
NEUTROPHILS NFR BLD AUTO: 78.5 % (ref 42.7–76)
NRBC BLD AUTO-RTO: 0 /100 WBC (ref 0–0.2)
PLATELET # BLD AUTO: 250 10*3/MM3 (ref 140–450)
PMV BLD AUTO: 8.9 FL (ref 6–12)
POTASSIUM SERPL-SCNC: 5.1 MMOL/L (ref 3.5–5.2)
RBC # BLD AUTO: 4.37 10*6/MM3 (ref 3.77–5.28)
SODIUM SERPL-SCNC: 138 MMOL/L (ref 136–145)
WBC NRBC COR # BLD: 9.2 10*3/MM3 (ref 3.4–10.8)

## 2023-03-09 PROCEDURE — 80048 BASIC METABOLIC PNL TOTAL CA: CPT

## 2023-03-09 PROCEDURE — 99283 EMERGENCY DEPT VISIT LOW MDM: CPT

## 2023-03-09 PROCEDURE — 96372 THER/PROPH/DIAG INJ SC/IM: CPT

## 2023-03-09 PROCEDURE — 85025 COMPLETE CBC W/AUTO DIFF WBC: CPT

## 2023-03-09 PROCEDURE — 25010000002 KETOROLAC TROMETHAMINE PER 15 MG

## 2023-03-09 RX ORDER — KETOROLAC TROMETHAMINE 30 MG/ML
30 INJECTION, SOLUTION INTRAMUSCULAR; INTRAVENOUS ONCE
Status: COMPLETED | OUTPATIENT
Start: 2023-03-09 | End: 2023-03-09

## 2023-03-09 RX ORDER — MEDROXYPROGESTERONE ACETATE 10 MG/1
10 TABLET ORAL 3 TIMES DAILY PRN
Qty: 30 TABLET | Refills: 0 | Status: SHIPPED | OUTPATIENT
Start: 2023-03-09

## 2023-03-09 RX ORDER — HYDROCODONE BITARTRATE AND ACETAMINOPHEN 5; 325 MG/1; MG/1
1 TABLET ORAL EVERY 6 HOURS PRN
Qty: 12 TABLET | Refills: 0 | Status: SHIPPED | OUTPATIENT
Start: 2023-03-09

## 2023-03-09 RX ORDER — SODIUM CHLORIDE 0.9 % (FLUSH) 0.9 %
10 SYRINGE (ML) INJECTION AS NEEDED
Status: DISCONTINUED | OUTPATIENT
Start: 2023-03-09 | End: 2023-03-09 | Stop reason: HOSPADM

## 2023-03-09 RX ADMIN — KETOROLAC TROMETHAMINE 30 MG: 30 INJECTION, SOLUTION INTRAMUSCULAR; INTRAVENOUS at 09:53

## 2023-03-09 RX ADMIN — HYDROCODONE BITARTRATE AND ACETAMINOPHEN 1 TABLET: 5; 325 TABLET ORAL at 00:13

## 2023-03-09 NOTE — ED PROVIDER NOTES
Subjective   History of Present Illness  Chief Complaint: Pelvic pain, vaginal bleeding      HPI: Patient is a 73-year-old female who presents to the ER today complaining of pelvic pain and vaginal bleeding.  She was seen in the emergency room yesterday and diagnosed with an ovarian mass, attempted to follow-up with GYN today stated that they were unable to get in with her until April, Dr. Chen advised her to return to the emergency room as her pain is persistent and she feels that the bleeding is become worse.  She is having no lightheadedness, fatigue.    PCP: Erma  Gyn: Black         Review of Systems   Genitourinary: Positive for pelvic pain and vaginal bleeding.       Past Medical History:   Diagnosis Date   • 3-vessel CAD 09/06/2018    Severe on Cardiac Cath; Daily Aspirin--S/p CABG   • Abnormal EKG 09/04/2018   • Anxiety     Xanax   • Breast pain, left 09/06/2018    Per Pt   • Cataract    • Chest pain 09/2018   • Cholecystitis 07/2007    S/p Cholecystectomy   • Cholecystolithiasis 07/2007    S/p Cholecystectomy   • Colitis 2007   • COPD (chronic obstructive pulmonary disease) ([facility])    • Diverticulosis 2007   • DM (diabetes mellitus) ([facility]) Since 1948    T2--Metformin Currently   • Dyslipidemia     Controlled w/Meds   • Family history of heart disease     Mother & Father Both Had MI's   • Gastritis 2007   • History of bone density study 07/31/2008-[facility]   • Hx of chest x-ray 09/4/18-[facility]    WNL w/Calcified Atherosclerotic Disease in Thoracic Aorta   • Hx of CT scan of chest 09/4/18-[facility]    Normal Findings with Degenerative Changes of Spine; No Evidence of Pulmonary Embolous Seen; 2.1CM Thyroid Nodule Noted   • Hypertension Since 1948    Controlled w/Meds   • Kidney disease, chronic, stage III (GFR 30-59 ml/min) ([facility])    • Nasal congestion 12/2016    Treated @ [facility] w/Antibiotics   • Postmenopausal    • Renal disease    • Ringing of ears, bilateral    • SOB (shortness of breath)      Chronic   • Stress    • Tobacco use     1 PPD       Allergies   Allergen Reactions   • Codeine Unknown (See Comments)     Unknown       Past Surgical History:   Procedure Laterality Date   • BREAST SURGERY Right     biopsy   • CARDIAC CATHETERIZATION  18-[facility]    Dr. Hurtado--Severe L Main 3-V CAD   • CARDIAC SURGERY     • CHOLECYSTECTOMY  2007    Lap--Dusty Hernandez MD   • COLONOSCOPY     • COLONOSCOPY N/A 2019    Procedure: COLONOSCOPY with polypectomy X2;  Surgeon: Jazz More MD;  Location: [facility] ENDOSCOPY;  Service: Gastroenterology   • CORONARY ARTERY BYPASS GRAFT  18-[facility]    Urgent x 4 with Left Saphenous Vein Grafting-Dr. Palmer   • ENDOSCOPIC VEIN HARVEST  18-[facility]    Of R Lower Extremity-Dr. Palmer   • ENDOSCOPY  07-[facility]    w/EGD-Erich Wasserman MD   • HAND SURGERY     • LAPAROSCOPIC TUBAL LIGATION         Family History   Problem Relation Age of Onset   • Heart attack Mother    • Coronary artery disease Mother         Had CABG   • Heart attack Father          Age 63       Social History     Socioeconomic History   • Marital status:      Spouse name: Benji   Tobacco Use   • Smoking status: Every Day     Packs/day: 1.00     Types: Cigarettes     Last attempt to quit: 2018     Years since quittin.5   • Smokeless tobacco: Never   Vaping Use   • Vaping Use: Never used   Substance and Sexual Activity   • Alcohol use: No   • Drug use: No   • Sexual activity: Defer     Birth control/protection: Post-menopausal, Surgical     Comment: TL           Objective   Physical Exam  Vitals reviewed.   Constitutional:       General: She is not in acute distress.     Appearance: She is ill-appearing.   HENT:      Head: Normocephalic.   Eyes:      Extraocular Movements: Extraocular movements intact.      Pupils: Pupils are equal, round, and reactive to light.   Cardiovascular:      Rate and Rhythm: Normal rate.      Pulses: Normal pulses.   Pulmonary:      Effort:  "Pulmonary effort is normal.      Breath sounds: Normal breath sounds.   Abdominal:      General: Bowel sounds are normal.      Palpations: Abdomen is soft.      Tenderness: There is abdominal tenderness in the right lower quadrant, suprapubic area and left lower quadrant.   Musculoskeletal:         General: Normal range of motion.      Cervical back: Neck supple. No rigidity.   Skin:     General: Skin is warm and dry.      Capillary Refill: Capillary refill takes less than 2 seconds.      Findings: No bruising.   Neurological:      General: No focal deficit present.      Mental Status: She is alert and oriented to person, place, and time. Mental status is at baseline.         Procedures           ED Course  ED Course as of 03/09/23 1620   Thu Mar 09, 2023   1026 Spoke with Dr. Monterroso with OB/GYN who advised starting the patient on Provera 10 mg 3 times a day for 5 days, followed by twice a day for 5 days, stop taking medication if she then begins to bleed more than 1 pad per hour to start again taking it 3 times a day and to call their office for refill.  Follow-up in April is okay.  Tylenol and ibuprofen as needed for discomfort.  [BH]      ED Course User Index  [BH] Gallagher Ayesha GALLOWAY, APRN      /85   Pulse 69   Temp 98 °F (36.7 °C)   Resp 16   Ht 167.6 cm (66\")   Wt 77.6 kg (171 lb)   SpO2 100%   BMI 27.60 kg/m²   Labs Reviewed   BASIC METABOLIC PANEL - Abnormal; Notable for the following components:       Result Value    Glucose 148 (*)     Creatinine 1.23 (*)     eGFR 45.9 (*)     All other components within normal limits    Narrative:     GFR Normal >60  Chronic Kidney Disease <60  Kidney Failure <15    The GFR formula is only valid for adults with stable renal function between ages 18 and 70.   CBC WITH AUTO DIFFERENTIAL - Abnormal; Notable for the following components:    MCV 97.9 (*)     Neutrophil % 78.5 (*)     Lymphocyte % 14.6 (*)     Neutrophils, Absolute 7.20 (*)     All other components " within normal limits   CBC AND DIFFERENTIAL    Narrative:     The following orders were created for panel order CBC & Differential.  Procedure                               Abnormality         Status                     ---------                               -----------         ------                     CBC Auto Differential[[id number]]        Abnormal            Final result                 Please view results for these tests on the individual orders.     Medications   ketorolac (TORADOL) injection 30 mg (30 mg Intramuscular Given 3/9/23 0953)     US Non-ob Transvaginal    Result Date: 3/8/2023  1.Large complex exophytic mass arising from left ovary measuring 7.5 x 6.5 x 8.6 cm. This corresponds to findings seen on abdomen CT from the same day. This complex mass is again concerning for an ovarian malignancy. Nonemergent MRI can be obtained for further evaluation. GYN consultation is recommended. 2.Complex fluid/blood products/soft tissue mass (measuring 3.5 x 3.2 x 3.3 cm) within lower uterine segment endometrial cavity/cervix. Thickened and heterogeneous endometrium. This finding is also concerning for endometrial malignancy. Nonemergent MRI can be obtained for further evaluation. GYN consultation is recommended. 3.Right ovary is not visualized.         Electronically Signed: Miguel A Ramsey  3/8/2023 10:46 PM EST  Workstation ID: [id number]    US Pelvis Complete    Result Date: 3/8/2023  1.Large complex exophytic mass arising from left ovary measuring 7.5 x 6.5 x 8.6 cm. This corresponds to findings seen on abdomen CT from the same day. This complex mass is again concerning for an ovarian malignancy. Nonemergent MRI can be obtained for further evaluation. GYN consultation is recommended. 2.Complex fluid/blood products/soft tissue mass (measuring 3.5 x 3.2 x 3.3 cm) within lower uterine segment endometrial cavity/cervix. Thickened and heterogeneous endometrium. This finding is also concerning for endometrial  malignancy. Nonemergent MRI can be obtained for further evaluation. GYN consultation is recommended. 3.Right ovary is not visualized.         Electronically Signed: Miguel A Ramsey  3/8/2023 10:46 PM EST  Workstation ID: [id number]    CT Abdomen Pelvis With Contrast    Result Date: 3/8/2023  1.Large left ovarian cystic lesion measuring 9.5 x 5.9 x 7.7 cm. This is an abnormal finding in a postmenopausal female and concerning for ovarian malignancy. Nonemergent pelvic MRI could be performed for further evaluation. 2.Dilated endometrial cavity measuring 3.9 cm in maximum dimension containing complex fluid. This is an abnormal finding in a postmenopausal female and may represent endometrial malignancy. Nonemergent MRI pelvis can be performed for further evaluation. 3.Diffuse esophageal wall thickening may represent esophagitis or other etiologies. Follow-up recommended. 4.1.6 cm hyperdense lesion in right kidney that is not a simple cyst. Nonemergent MRI can be performed for further evaluation. 5.Diverticulosis without diverticulitis. Electronically Signed: Miguel A Ramsey  3/8/2023 9:20 PM EST  Workstation ID: [id number]                                         Medical Decision Making  Ms. Wu is a 73-year-old female who presents to the ER today, returned following a visit last night in the emergency room for same complaint.  She feels that her bleeding is worse today stating that when she awoke got out of bed she had excessive bleeding and she continues to have pain.  CBC was obtained, hemoglobin noted at 13.8 in comparison to last night at 13.2, she is hemodynamically stable.  Discussed case with Dr. Monterroso who advised starting the patient on Provera 10 mg 3 times a day for 5 days, then decreasing to twice a day for 5 days, stated that she would likely continue to have vaginal bleeding, when she stopped after the 10-day treatment if she began having heavy bleeding again more than 1 pad per hour to begins taking the Provera  again 3 times a day.  Follow-up in their office at next available appointment.  We discussed Tylenol and ibuprofen as needed for discomfort as well, she was also given a prescription for Norco at her most recent ER visit that she has not picked up.  Patient tearful at reevaluation frustrated related to her ER findings and does not feel like her outpatient follow-up will be completed in a timely manner we discussed the Provera as well as seeing if she can get on a cancellation list with OB/GYN.  Family now at bedside reassuring the patient that treatment would be done appropriately.  Patient gave verbal understanding of the ED course as well as the importance of outpatient follow-up.  She was alert oriented nontoxic in appearance, hemodynamically stable at time of discharge, denied further complaints or questions.    Chart review: Imaging, labs, note   1/23/2023 patient was seen at Dr. Hurtado for follow-up for coronary artery disease.      This document is intended for medical expert use only. Reading of this document by patients and/or patient's family without participating medical staff guidance may result in misinterpretation and unintended morbidity. Any interpretation of such data is the responsibility of the patient and/or family member responsible for the patient in concert with their primary or specialist providers, not to be left for sources of online searches such as [employer], [employer] or similar queries. Relying on these approaches to knowledge may result in misinterpretation, misguided goals of care and even death should patients or family members try recommendations outside of the realm of professional medical care in a supervised inpatient environment.     Appropriate PPE worn during exam.    Abnormal uterine bleeding: complicated acute illness or injury  Amount and/or Complexity of Data Reviewed  Labs: ordered.      Risk  Prescription drug management.          Final diagnoses:   Abnormal uterine bleeding    Pelvic pain       ED Disposition  ED Disposition     ED Disposition   Discharge    Condition   Stable    Comment   --             Marky Ritchie MD  [facility] [address]  [place] [address] [phone]          Elaina Cleaning MD  [facility] [address]  [place] [address]  [phone]    Call            Medication List      New Prescriptions    medroxyPROGESTERone 10 MG tablet  Commonly known as: Provera  Take 1 tablet by mouth 3 (Three) Times a Day As Needed (excessive vaginal bleeding). Take 1 tablet 3 times a day for 5 days, take 1 tablet twice a day for 5 days.  Then begin taking 3 times a day as needed for excessive vaginal bleeding.           Where to Get Your Medications      These medications were sent to [facility] [id number] - [place], [place] - [address] AT [facility] [address] - [phone] PH - [phone] FX  [address] [place] [address]    Phone: [phone]   · medroxyPROGESTERone 10 MG tablet          Ayesha Gallagher, APRN  03/09/23 8603

## 2023-03-09 NOTE — DISCHARGE INSTRUCTIONS
Close follow-up with gynecologist and/or gynecologist oncologist.  Call tomorrow.  Follow-up with primary care provider.  Return for new or worsening symptoms.

## 2023-03-09 NOTE — DISCHARGE INSTRUCTIONS
Prescription for Provera was sent to your preferred pharmacy take as directed  Prescription for norco also sent to pharmacy last night.    When she finished the 10-day round, stop taking if you begin having excessive bleeding more than 1 pad per hour begin again at 3 times a day    Follow-up with gynecology    Follow-up with primary care    Tylenol and ibuprofen as needed for discomfort as well as heating pads 20 minutes time several times a day    Return to the ER for new or worsening symptoms

## 2023-03-16 ENCOUNTER — TELEPHONE (OUTPATIENT)
Dept: CARDIOLOGY | Facility: CLINIC | Age: 73
End: 2023-03-16
Payer: MEDICARE

## 2023-03-16 NOTE — TELEPHONE ENCOUNTER
DR. KHADIJAH HOLCOMB  TOTAL ROBOTIC HYSTERECTOMY, BILATERAL SALPINGO-OOPHORECTOMY STAGING  SURGERY 3/24/23  PHONE [phone] OPT 2  [phone]    PLACED ON DR. WILLIE SARAVIA DESK.

## 2023-04-21 ENCOUNTER — LAB (OUTPATIENT)
Dept: LAB | Facility: HOSPITAL | Age: 73
End: 2023-04-21
Payer: MEDICARE

## 2023-04-21 ENCOUNTER — TRANSCRIBE ORDERS (OUTPATIENT)
Dept: ADMINISTRATIVE | Facility: HOSPITAL | Age: 73
End: 2023-04-21
Payer: MEDICARE

## 2023-04-21 DIAGNOSIS — E11.8 TYPE 2 DIABETES MELLITUS WITH UNSPECIFIED COMPLICATIONS: ICD-10-CM

## 2023-04-21 DIAGNOSIS — N18.30 STAGE 3 CHRONIC KIDNEY DISEASE, UNSPECIFIED WHETHER STAGE 3A OR 3B CKD: ICD-10-CM

## 2023-04-21 DIAGNOSIS — E55.9 VITAMIN D DEFICIENCY DISEASE: ICD-10-CM

## 2023-04-21 DIAGNOSIS — N18.30 STAGE 3 CHRONIC KIDNEY DISEASE, UNSPECIFIED WHETHER STAGE 3A OR 3B CKD: Primary | ICD-10-CM

## 2023-04-21 LAB
25(OH)D3 SERPL-MCNC: 75.5 NG/ML (ref 30–100)
ANION GAP SERPL CALCULATED.3IONS-SCNC: 11 MMOL/L (ref 5–15)
BACTERIA UR QL AUTO: NORMAL /HPF
BASOPHILS # BLD AUTO: 0.05 10*3/MM3 (ref 0–0.2)
BASOPHILS NFR BLD AUTO: 0.9 % (ref 0–1.5)
BILIRUB UR QL STRIP: NEGATIVE
BUN SERPL-MCNC: 19 MG/DL (ref 8–23)
BUN/CREAT SERPL: 14.5 (ref 7–25)
CALCIUM SPEC-SCNC: 9.3 MG/DL (ref 8.6–10.5)
CHLORIDE SERPL-SCNC: 99 MMOL/L (ref 98–107)
CLARITY UR: CLEAR
CO2 SERPL-SCNC: 27 MMOL/L (ref 22–29)
COLOR UR: YELLOW
CREAT SERPL-MCNC: 1.31 MG/DL (ref 0.57–1)
CREAT UR-MCNC: 33.9 MG/DL
DEPRECATED RDW RBC AUTO: 42.8 FL (ref 37–54)
EGFRCR SERPLBLD CKD-EPI 2021: 42.6 ML/MIN/1.73
EOSINOPHIL # BLD AUTO: 0.3 10*3/MM3 (ref 0–0.4)
EOSINOPHIL NFR BLD AUTO: 5.2 % (ref 0.3–6.2)
ERYTHROCYTE [DISTWIDTH] IN BLOOD BY AUTOMATED COUNT: 11.9 % (ref 12.3–15.4)
GLUCOSE SERPL-MCNC: 116 MG/DL (ref 65–99)
GLUCOSE UR STRIP-MCNC: NEGATIVE MG/DL
HBA1C MFR BLD: 5.5 % (ref 4.8–5.6)
HCT VFR BLD AUTO: 41.7 % (ref 34–46.6)
HGB BLD-MCNC: 13.7 G/DL (ref 12–15.9)
HGB UR QL STRIP.AUTO: NEGATIVE
HYALINE CASTS UR QL AUTO: NORMAL /LPF
IMM GRANULOCYTES # BLD AUTO: 0.01 10*3/MM3 (ref 0–0.05)
IMM GRANULOCYTES NFR BLD AUTO: 0.2 % (ref 0–0.5)
KETONES UR QL STRIP: NEGATIVE
LEUKOCYTE ESTERASE UR QL STRIP.AUTO: NEGATIVE
LYMPHOCYTES # BLD AUTO: 2.16 10*3/MM3 (ref 0.7–3.1)
LYMPHOCYTES NFR BLD AUTO: 37.6 % (ref 19.6–45.3)
MCH RBC QN AUTO: 31.7 PG (ref 26.6–33)
MCHC RBC AUTO-ENTMCNC: 32.9 G/DL (ref 31.5–35.7)
MCV RBC AUTO: 96.5 FL (ref 79–97)
MONOCYTES # BLD AUTO: 0.31 10*3/MM3 (ref 0.1–0.9)
MONOCYTES NFR BLD AUTO: 5.4 % (ref 5–12)
NEUTROPHILS NFR BLD AUTO: 2.91 10*3/MM3 (ref 1.7–7)
NEUTROPHILS NFR BLD AUTO: 50.7 % (ref 42.7–76)
NITRITE UR QL STRIP: NEGATIVE
NRBC BLD AUTO-RTO: 0.2 /100 WBC (ref 0–0.2)
PH UR STRIP.AUTO: 7 [PH] (ref 5–8)
PLATELET # BLD AUTO: 262 10*3/MM3 (ref 140–450)
PMV BLD AUTO: 11.4 FL (ref 6–12)
POTASSIUM SERPL-SCNC: 4.7 MMOL/L (ref 3.5–5.2)
PROT ?TM UR-MCNC: 36.5 MG/DL
PROT UR QL STRIP: ABNORMAL
PROT/CREAT UR: 1076.7 MG/G CREA (ref 0–200)
PTH-INTACT SERPL-MCNC: 32.3 PG/ML (ref 15–65)
RBC # BLD AUTO: 4.32 10*6/MM3 (ref 3.77–5.28)
RBC # UR STRIP: NORMAL /HPF
REF LAB TEST METHOD: NORMAL
SODIUM SERPL-SCNC: 137 MMOL/L (ref 136–145)
SP GR UR STRIP: 1.01 (ref 1–1.03)
SQUAMOUS #/AREA URNS HPF: NORMAL /HPF
URATE SERPL-MCNC: 4.3 MG/DL (ref 2.4–5.7)
UROBILINOGEN UR QL STRIP: ABNORMAL
WBC # UR STRIP: NORMAL /HPF
WBC NRBC COR # BLD: 5.74 10*3/MM3 (ref 3.4–10.8)

## 2023-04-21 PROCEDURE — 84156 ASSAY OF PROTEIN URINE: CPT

## 2023-04-21 PROCEDURE — 82306 VITAMIN D 25 HYDROXY: CPT

## 2023-04-21 PROCEDURE — 80048 BASIC METABOLIC PNL TOTAL CA: CPT

## 2023-04-21 PROCEDURE — 36415 COLL VENOUS BLD VENIPUNCTURE: CPT

## 2023-04-21 PROCEDURE — 85025 COMPLETE CBC W/AUTO DIFF WBC: CPT

## 2023-04-21 PROCEDURE — 82570 ASSAY OF URINE CREATININE: CPT

## 2023-04-21 PROCEDURE — 83970 ASSAY OF PARATHORMONE: CPT

## 2023-04-21 PROCEDURE — 83036 HEMOGLOBIN GLYCOSYLATED A1C: CPT

## 2023-04-21 PROCEDURE — 81001 URINALYSIS AUTO W/SCOPE: CPT

## 2023-04-21 PROCEDURE — 84550 ASSAY OF BLOOD/URIC ACID: CPT

## 2023-08-03 ENCOUNTER — OFFICE VISIT (OUTPATIENT)
Dept: CARDIOLOGY | Facility: CLINIC | Age: 73
End: 2023-08-03
Payer: MEDICARE

## 2023-08-03 VITALS
HEART RATE: 71 BPM | SYSTOLIC BLOOD PRESSURE: 140 MMHG | WEIGHT: 153.76 LBS | HEIGHT: 66 IN | BODY MASS INDEX: 24.71 KG/M2 | DIASTOLIC BLOOD PRESSURE: 58 MMHG

## 2023-08-03 DIAGNOSIS — E11.9 TYPE 2 DIABETES MELLITUS WITHOUT COMPLICATION, WITHOUT LONG-TERM CURRENT USE OF INSULIN: ICD-10-CM

## 2023-08-03 DIAGNOSIS — J44.9 CHRONIC OBSTRUCTIVE PULMONARY DISEASE, UNSPECIFIED COPD TYPE: ICD-10-CM

## 2023-08-03 DIAGNOSIS — I25.118 CORONARY ARTERY DISEASE OF NATIVE ARTERY OF NATIVE HEART WITH STABLE ANGINA PECTORIS: Primary | ICD-10-CM

## 2023-08-03 DIAGNOSIS — I10 ESSENTIAL HYPERTENSION: ICD-10-CM

## 2023-08-03 DIAGNOSIS — E78.00 PURE HYPERCHOLESTEROLEMIA: ICD-10-CM

## 2024-04-23 ENCOUNTER — TRANSCRIBE ORDERS (OUTPATIENT)
Dept: ADMINISTRATIVE | Facility: HOSPITAL | Age: 74
End: 2024-04-23
Payer: MEDICARE

## 2024-04-23 ENCOUNTER — LAB (OUTPATIENT)
Dept: LAB | Facility: HOSPITAL | Age: 74
End: 2024-04-23
Payer: MEDICARE

## 2024-04-23 DIAGNOSIS — E55.9 AVITAMINOSIS D: ICD-10-CM

## 2024-04-23 DIAGNOSIS — E11.8 DIABETIC COMPLICATION: ICD-10-CM

## 2024-04-23 DIAGNOSIS — N18.30 STAGE 3 CHRONIC KIDNEY DISEASE, UNSPECIFIED WHETHER STAGE 3A OR 3B CKD: Primary | ICD-10-CM

## 2024-04-23 DIAGNOSIS — N18.30 STAGE 3 CHRONIC KIDNEY DISEASE, UNSPECIFIED WHETHER STAGE 3A OR 3B CKD: ICD-10-CM

## 2024-04-23 LAB
25(OH)D3 SERPL-MCNC: 43.3 NG/ML (ref 30–100)
ANION GAP SERPL CALCULATED.3IONS-SCNC: 10 MMOL/L (ref 5–15)
BACTERIA UR QL AUTO: NORMAL /HPF
BASOPHILS # BLD AUTO: 0.06 10*3/MM3 (ref 0–0.2)
BASOPHILS NFR BLD AUTO: 0.8 % (ref 0–1.5)
BILIRUB UR QL STRIP: NEGATIVE
BUN SERPL-MCNC: 22 MG/DL (ref 8–23)
BUN/CREAT SERPL: 19.5 (ref 7–25)
CALCIUM SPEC-SCNC: 9.6 MG/DL (ref 8.6–10.5)
CHLORIDE SERPL-SCNC: 102 MMOL/L (ref 98–107)
CK SERPL-CCNC: 37 U/L (ref 20–180)
CLARITY UR: CLEAR
CO2 SERPL-SCNC: 27 MMOL/L (ref 22–29)
COLOR UR: YELLOW
CREAT SERPL-MCNC: 1.13 MG/DL (ref 0.57–1)
CREAT UR-MCNC: 15.6 MG/DL
DEPRECATED RDW RBC AUTO: 54.8 FL (ref 37–54)
EGFRCR SERPLBLD CKD-EPI 2021: 50.5 ML/MIN/1.73
EOSINOPHIL # BLD AUTO: 0.26 10*3/MM3 (ref 0–0.4)
EOSINOPHIL NFR BLD AUTO: 3.6 % (ref 0.3–6.2)
ERYTHROCYTE [DISTWIDTH] IN BLOOD BY AUTOMATED COUNT: 14.8 % (ref 12.3–15.4)
GLUCOSE SERPL-MCNC: 112 MG/DL (ref 65–99)
GLUCOSE UR STRIP-MCNC: NEGATIVE MG/DL
HBA1C MFR BLD: 5.1 % (ref 4.8–5.6)
HCT VFR BLD AUTO: 38.2 % (ref 34–46.6)
HGB BLD-MCNC: 11.5 G/DL (ref 12–15.9)
HGB UR QL STRIP.AUTO: NEGATIVE
HYALINE CASTS UR QL AUTO: NORMAL /LPF
IMM GRANULOCYTES # BLD AUTO: 0.01 10*3/MM3 (ref 0–0.05)
IMM GRANULOCYTES NFR BLD AUTO: 0.1 % (ref 0–0.5)
KETONES UR QL STRIP: NEGATIVE
LEUKOCYTE ESTERASE UR QL STRIP.AUTO: NEGATIVE
LYMPHOCYTES # BLD AUTO: 2.38 10*3/MM3 (ref 0.7–3.1)
LYMPHOCYTES NFR BLD AUTO: 33.3 % (ref 19.6–45.3)
MAGNESIUM SERPL-MCNC: 2.1 MG/DL (ref 1.6–2.4)
MCH RBC QN AUTO: 30.1 PG (ref 26.6–33)
MCHC RBC AUTO-ENTMCNC: 30.1 G/DL (ref 31.5–35.7)
MCV RBC AUTO: 100 FL (ref 79–97)
MONOCYTES # BLD AUTO: 0.37 10*3/MM3 (ref 0.1–0.9)
MONOCYTES NFR BLD AUTO: 5.2 % (ref 5–12)
NEUTROPHILS NFR BLD AUTO: 4.07 10*3/MM3 (ref 1.7–7)
NEUTROPHILS NFR BLD AUTO: 57 % (ref 42.7–76)
NITRITE UR QL STRIP: NEGATIVE
NRBC BLD AUTO-RTO: 0 /100 WBC (ref 0–0.2)
PH UR STRIP.AUTO: 7.5 [PH] (ref 5–8)
PHOSPHATE SERPL-MCNC: 4 MG/DL (ref 2.5–4.5)
PLATELET # BLD AUTO: 275 10*3/MM3 (ref 140–450)
PMV BLD AUTO: 10.9 FL (ref 6–12)
POTASSIUM SERPL-SCNC: 5.1 MMOL/L (ref 3.5–5.2)
PROT ?TM UR-MCNC: 64.9 MG/DL
PROT UR QL STRIP: ABNORMAL
PROT/CREAT UR: 4160.3 MG/G CREA (ref 0–200)
RBC # BLD AUTO: 3.82 10*6/MM3 (ref 3.77–5.28)
RBC # UR STRIP: NORMAL /HPF
REF LAB TEST METHOD: NORMAL
SODIUM SERPL-SCNC: 139 MMOL/L (ref 136–145)
SP GR UR STRIP: <=1.005 (ref 1–1.03)
SQUAMOUS #/AREA URNS HPF: NORMAL /HPF
TSH SERPL DL<=0.05 MIU/L-ACNC: 0.2 UIU/ML (ref 0.27–4.2)
URATE SERPL-MCNC: 4.9 MG/DL (ref 2.4–5.7)
UROBILINOGEN UR QL STRIP: ABNORMAL
WBC # UR STRIP: NORMAL /HPF
WBC NRBC COR # BLD AUTO: 7.15 10*3/MM3 (ref 3.4–10.8)

## 2024-04-23 PROCEDURE — 85025 COMPLETE CBC W/AUTO DIFF WBC: CPT

## 2024-04-23 PROCEDURE — 84156 ASSAY OF PROTEIN URINE: CPT

## 2024-04-23 PROCEDURE — 82570 ASSAY OF URINE CREATININE: CPT

## 2024-04-23 PROCEDURE — 82550 ASSAY OF CK (CPK): CPT

## 2024-04-23 PROCEDURE — 84443 ASSAY THYROID STIM HORMONE: CPT

## 2024-04-23 PROCEDURE — 84550 ASSAY OF BLOOD/URIC ACID: CPT

## 2024-04-23 PROCEDURE — 81001 URINALYSIS AUTO W/SCOPE: CPT

## 2024-04-23 PROCEDURE — 36415 COLL VENOUS BLD VENIPUNCTURE: CPT

## 2024-04-23 PROCEDURE — 82306 VITAMIN D 25 HYDROXY: CPT

## 2024-04-23 PROCEDURE — 84100 ASSAY OF PHOSPHORUS: CPT

## 2024-04-23 PROCEDURE — 83735 ASSAY OF MAGNESIUM: CPT

## 2024-04-23 PROCEDURE — 80048 BASIC METABOLIC PNL TOTAL CA: CPT

## 2024-04-23 PROCEDURE — 83036 HEMOGLOBIN GLYCOSYLATED A1C: CPT

## 2024-07-11 ENCOUNTER — OFFICE VISIT (OUTPATIENT)
Dept: CARDIOLOGY | Facility: CLINIC | Age: 74
End: 2024-07-11
Payer: MEDICARE

## 2024-07-11 VITALS
BODY MASS INDEX: 23.14 KG/M2 | OXYGEN SATURATION: 97 % | WEIGHT: 144 LBS | HEIGHT: 66 IN | HEART RATE: 77 BPM | DIASTOLIC BLOOD PRESSURE: 49 MMHG | SYSTOLIC BLOOD PRESSURE: 145 MMHG

## 2024-07-11 DIAGNOSIS — I25.10 CORONARY ARTERY DISEASE INVOLVING NATIVE CORONARY ARTERY OF NATIVE HEART WITHOUT ANGINA PECTORIS: Primary | ICD-10-CM

## 2024-07-11 DIAGNOSIS — E11.9 TYPE 2 DIABETES MELLITUS WITHOUT COMPLICATION, WITHOUT LONG-TERM CURRENT USE OF INSULIN: ICD-10-CM

## 2024-07-11 DIAGNOSIS — I10 PRIMARY HYPERTENSION: ICD-10-CM

## 2024-07-11 DIAGNOSIS — E78.00 PURE HYPERCHOLESTEROLEMIA: ICD-10-CM

## 2024-07-11 RX ORDER — HYDRALAZINE HYDROCHLORIDE 25 MG/1
25 TABLET, FILM COATED ORAL 2 TIMES DAILY
COMMUNITY

## 2024-07-11 RX ORDER — ASPIRIN 81 MG/1
81 TABLET ORAL DAILY
COMMUNITY

## 2024-10-31 ENCOUNTER — LAB (OUTPATIENT)
Dept: LAB | Facility: HOSPITAL | Age: 74
End: 2024-10-31
Payer: MEDICARE

## 2024-10-31 ENCOUNTER — TRANSCRIBE ORDERS (OUTPATIENT)
Dept: ADMINISTRATIVE | Facility: HOSPITAL | Age: 74
End: 2024-10-31
Payer: MEDICARE

## 2024-10-31 DIAGNOSIS — E78.5 HYPERLIPIDEMIA, UNSPECIFIED HYPERLIPIDEMIA TYPE: ICD-10-CM

## 2024-10-31 DIAGNOSIS — M15.9 GENERALIZED OSTEOARTHROSIS, INVOLVING MULTIPLE SITES: ICD-10-CM

## 2024-10-31 DIAGNOSIS — E55.9 AVITAMINOSIS D: ICD-10-CM

## 2024-10-31 DIAGNOSIS — N04.9 CONGENITAL NEPHROTIC SYNDROME: ICD-10-CM

## 2024-10-31 DIAGNOSIS — N18.31 CHRONIC KIDNEY DISEASE (CKD) STAGE G3A/A1, MODERATELY DECREASED GLOMERULAR FILTRATION RATE (GFR) BETWEEN 45-59 ML/MIN/1.73 SQUARE METER AND ALBUMINURIA CREATININE RATIO LESS THAN 30 MG/G (CMS/H*: Primary | ICD-10-CM

## 2024-10-31 DIAGNOSIS — E11.22 TYPE 2 DIABETES MELLITUS WITH STAGE 3 CHRONIC KIDNEY DISEASE, UNSPECIFIED WHETHER LONG TERM INSULIN USE, UNSPECIFIED WHETHER STAGE 3A OR 3B CKD: ICD-10-CM

## 2024-10-31 DIAGNOSIS — E83.42 HYPOMAGNESEMIA: ICD-10-CM

## 2024-10-31 DIAGNOSIS — N18.30 TYPE 2 DIABETES MELLITUS WITH STAGE 3 CHRONIC KIDNEY DISEASE, UNSPECIFIED WHETHER LONG TERM INSULIN USE, UNSPECIFIED WHETHER STAGE 3A OR 3B CKD: ICD-10-CM

## 2024-10-31 DIAGNOSIS — N18.31 CHRONIC KIDNEY DISEASE (CKD) STAGE G3A/A1, MODERATELY DECREASED GLOMERULAR FILTRATION RATE (GFR) BETWEEN 45-59 ML/MIN/1.73 SQUARE METER AND ALBUMINURIA CREATININE RATIO LESS THAN 30 MG/G (CMS/H*: ICD-10-CM

## 2024-10-31 LAB
25(OH)D3 SERPL-MCNC: 46.7 NG/ML (ref 30–100)
ALBUMIN SERPL-MCNC: 4.2 G/DL (ref 3.5–5.2)
ALBUMIN/GLOB SERPL: 1.1 G/DL
ALP SERPL-CCNC: 87 U/L (ref 39–117)
ALT SERPL W P-5'-P-CCNC: 14 U/L (ref 1–33)
ANION GAP SERPL CALCULATED.3IONS-SCNC: 10.5 MMOL/L (ref 5–15)
AST SERPL-CCNC: 23 U/L (ref 1–32)
BACTERIA UR QL AUTO: ABNORMAL /HPF
BASOPHILS # BLD AUTO: 0.04 10*3/MM3 (ref 0–0.2)
BASOPHILS NFR BLD AUTO: 0.7 % (ref 0–1.5)
BILIRUB SERPL-MCNC: 0.3 MG/DL (ref 0–1.2)
BILIRUB UR QL STRIP: NEGATIVE
BUN SERPL-MCNC: 27 MG/DL (ref 8–23)
BUN/CREAT SERPL: 17.8 (ref 7–25)
CALCIUM SPEC-SCNC: 10.2 MG/DL (ref 8.6–10.5)
CHLORIDE SERPL-SCNC: 98 MMOL/L (ref 98–107)
CK SERPL-CCNC: 82 U/L (ref 20–180)
CLARITY UR: ABNORMAL
CO2 SERPL-SCNC: 27.5 MMOL/L (ref 22–29)
COLOR UR: YELLOW
CREAT SERPL-MCNC: 1.52 MG/DL (ref 0.57–1)
CREAT UR-MCNC: 31.3 MG/DL
DEPRECATED RDW RBC AUTO: 54.2 FL (ref 37–54)
EGFRCR SERPLBLD CKD-EPI 2021: 35.4 ML/MIN/1.73
EOSINOPHIL # BLD AUTO: 0.2 10*3/MM3 (ref 0–0.4)
EOSINOPHIL NFR BLD AUTO: 3.5 % (ref 0.3–6.2)
ERYTHROCYTE [DISTWIDTH] IN BLOOD BY AUTOMATED COUNT: 15 % (ref 12.3–15.4)
GLOBULIN UR ELPH-MCNC: 3.8 GM/DL
GLUCOSE SERPL-MCNC: 126 MG/DL (ref 65–99)
GLUCOSE UR STRIP-MCNC: NEGATIVE MG/DL
HBA1C MFR BLD: 5.29 % (ref 4.8–5.6)
HCT VFR BLD AUTO: 45.4 % (ref 34–46.6)
HGB BLD-MCNC: 14 G/DL (ref 12–15.9)
HGB UR QL STRIP.AUTO: NEGATIVE
HYALINE CASTS UR QL AUTO: ABNORMAL /LPF
IMM GRANULOCYTES # BLD AUTO: 0.01 10*3/MM3 (ref 0–0.05)
IMM GRANULOCYTES NFR BLD AUTO: 0.2 % (ref 0–0.5)
KETONES UR QL STRIP: NEGATIVE
LEUKOCYTE ESTERASE UR QL STRIP.AUTO: ABNORMAL
LYMPHOCYTES # BLD AUTO: 2.46 10*3/MM3 (ref 0.7–3.1)
LYMPHOCYTES NFR BLD AUTO: 43.2 % (ref 19.6–45.3)
MAGNESIUM SERPL-MCNC: 2.7 MG/DL (ref 1.6–2.4)
MCH RBC QN AUTO: 29.8 PG (ref 26.6–33)
MCHC RBC AUTO-ENTMCNC: 30.8 G/DL (ref 31.5–35.7)
MCV RBC AUTO: 96.6 FL (ref 79–97)
MONOCYTES # BLD AUTO: 0.29 10*3/MM3 (ref 0.1–0.9)
MONOCYTES NFR BLD AUTO: 5.1 % (ref 5–12)
NEUTROPHILS NFR BLD AUTO: 2.7 10*3/MM3 (ref 1.7–7)
NEUTROPHILS NFR BLD AUTO: 47.3 % (ref 42.7–76)
NITRITE UR QL STRIP: NEGATIVE
NRBC BLD AUTO-RTO: 0 /100 WBC (ref 0–0.2)
PH UR STRIP.AUTO: 8 [PH] (ref 5–8)
PHOSPHATE SERPL-MCNC: 4.9 MG/DL (ref 2.5–4.5)
PLATELET # BLD AUTO: 272 10*3/MM3 (ref 140–450)
PMV BLD AUTO: 10.8 FL (ref 6–12)
POTASSIUM SERPL-SCNC: 4.8 MMOL/L (ref 3.5–5.2)
PROT ?TM UR-MCNC: 79.6 MG/DL
PROT SERPL-MCNC: 8 G/DL (ref 6–8.5)
PROT UR QL STRIP: ABNORMAL
PROT/CREAT UR: 2543.1 MG/G CREA (ref 0–200)
PTH-INTACT SERPL-MCNC: 30.3 PG/ML (ref 15–65)
RBC # BLD AUTO: 4.7 10*6/MM3 (ref 3.77–5.28)
RBC # UR STRIP: ABNORMAL /HPF
REF LAB TEST METHOD: ABNORMAL
SODIUM SERPL-SCNC: 136 MMOL/L (ref 136–145)
SP GR UR STRIP: 1.01 (ref 1–1.03)
SQUAMOUS #/AREA URNS HPF: ABNORMAL /HPF
URATE SERPL-MCNC: 4.8 MG/DL (ref 2.4–5.7)
UROBILINOGEN UR QL STRIP: ABNORMAL
WAXY CASTS #/AREA URNS LPF: ABNORMAL /LPF
WBC # UR STRIP: ABNORMAL /HPF
WBC NRBC COR # BLD AUTO: 5.7 10*3/MM3 (ref 3.4–10.8)
YEAST URNS QL MICRO: ABNORMAL /HPF

## 2024-10-31 PROCEDURE — 36415 COLL VENOUS BLD VENIPUNCTURE: CPT

## 2024-10-31 PROCEDURE — 82550 ASSAY OF CK (CPK): CPT

## 2024-10-31 PROCEDURE — 84156 ASSAY OF PROTEIN URINE: CPT

## 2024-10-31 PROCEDURE — 84550 ASSAY OF BLOOD/URIC ACID: CPT

## 2024-10-31 PROCEDURE — 82306 VITAMIN D 25 HYDROXY: CPT

## 2024-10-31 PROCEDURE — 86334 IMMUNOFIX E-PHORESIS SERUM: CPT

## 2024-10-31 PROCEDURE — 83036 HEMOGLOBIN GLYCOSYLATED A1C: CPT

## 2024-10-31 PROCEDURE — 82570 ASSAY OF URINE CREATININE: CPT

## 2024-10-31 PROCEDURE — 80053 COMPREHEN METABOLIC PANEL: CPT

## 2024-10-31 PROCEDURE — 85025 COMPLETE CBC W/AUTO DIFF WBC: CPT

## 2024-10-31 PROCEDURE — 82784 ASSAY IGA/IGD/IGG/IGM EACH: CPT

## 2024-10-31 PROCEDURE — 84100 ASSAY OF PHOSPHORUS: CPT

## 2024-10-31 PROCEDURE — 83970 ASSAY OF PARATHORMONE: CPT

## 2024-10-31 PROCEDURE — 84165 PROTEIN E-PHORESIS SERUM: CPT

## 2024-10-31 PROCEDURE — 83735 ASSAY OF MAGNESIUM: CPT

## 2024-10-31 PROCEDURE — 81001 URINALYSIS AUTO W/SCOPE: CPT

## 2024-11-01 LAB
ALBUMIN SERPL ELPH-MCNC: 3.7 G/DL (ref 2.9–4.4)
ALBUMIN/GLOB SERPL: 1.1 {RATIO} (ref 0.7–1.7)
ALPHA1 GLOB SERPL ELPH-MCNC: 0.2 G/DL (ref 0–0.4)
ALPHA2 GLOB SERPL ELPH-MCNC: 0.9 G/DL (ref 0.4–1)
B-GLOBULIN SERPL ELPH-MCNC: 1 G/DL (ref 0.7–1.3)
GAMMA GLOB SERPL ELPH-MCNC: 1.3 G/DL (ref 0.4–1.8)
GLOBULIN SER-MCNC: 3.5 G/DL (ref 2.2–3.9)
IGA SERPL-MCNC: 244 MG/DL (ref 64–422)
IGG SERPL-MCNC: 1463 MG/DL (ref 586–1602)
IGM SERPL-MCNC: 68 MG/DL (ref 26–217)
INTERPRETATION SERPL IEP-IMP: NORMAL
LABORATORY COMMENT REPORT: NORMAL
M PROTEIN SERPL ELPH-MCNC: NORMAL G/DL
PROT SERPL-MCNC: 7.2 G/DL (ref 6–8.5)

## 2025-01-27 ENCOUNTER — OFFICE VISIT (OUTPATIENT)
Dept: CARDIOLOGY | Facility: CLINIC | Age: 75
End: 2025-01-27
Payer: MEDICARE

## 2025-01-27 VITALS
HEIGHT: 66 IN | DIASTOLIC BLOOD PRESSURE: 62 MMHG | BODY MASS INDEX: 21.69 KG/M2 | OXYGEN SATURATION: 97 % | WEIGHT: 135 LBS | HEART RATE: 64 BPM | SYSTOLIC BLOOD PRESSURE: 132 MMHG

## 2025-01-27 DIAGNOSIS — E78.00 PURE HYPERCHOLESTEROLEMIA: ICD-10-CM

## 2025-01-27 DIAGNOSIS — I25.10 CORONARY ARTERY DISEASE INVOLVING NATIVE CORONARY ARTERY OF NATIVE HEART WITHOUT ANGINA PECTORIS: Primary | ICD-10-CM

## 2025-01-27 DIAGNOSIS — E11.9 TYPE 2 DIABETES MELLITUS WITHOUT COMPLICATION, WITHOUT LONG-TERM CURRENT USE OF INSULIN: ICD-10-CM

## 2025-01-27 DIAGNOSIS — I10 PRIMARY HYPERTENSION: ICD-10-CM

## 2025-01-27 PROCEDURE — 1159F MED LIST DOCD IN RCRD: CPT | Performed by: INTERNAL MEDICINE

## 2025-01-27 PROCEDURE — 1160F RVW MEDS BY RX/DR IN RCRD: CPT | Performed by: INTERNAL MEDICINE

## 2025-01-27 PROCEDURE — 3075F SYST BP GE 130 - 139MM HG: CPT | Performed by: INTERNAL MEDICINE

## 2025-01-27 PROCEDURE — 99214 OFFICE O/P EST MOD 30 MIN: CPT | Performed by: INTERNAL MEDICINE

## 2025-01-27 PROCEDURE — 3078F DIAST BP <80 MM HG: CPT | Performed by: INTERNAL MEDICINE

## 2025-01-27 RX ORDER — SODIUM BICARBONATE 650 MG/1
1 TABLET ORAL EVERY 12 HOURS SCHEDULED
COMMUNITY
Start: 2025-01-25

## 2025-01-27 RX ORDER — COLESEVELAM 180 1/1
1 TABLET ORAL EVERY 12 HOURS SCHEDULED
COMMUNITY
Start: 2025-01-24

## 2025-01-27 NOTE — PROGRESS NOTES
Subjective:     Encounter Date:01/27/2025      Patient ID: Kathy Wu is a 75 y.o. female.    Chief Complaint:  History of Present Illness 75-year-old white female with history of coronary disease status post coronary bypass surgery history of hypertension hyperlipidemia diabetes presents to my office for a follow-up.  Patient is currently stable without any symptoms of chest pain or shortness of breath at rest or exertion.  No complaint of any PND orthopnea.  No palpitations dizziness syncope or swelling of the feet.  Patient is taking all the medicines regular.  Patient still continues to smoke.    The following portions of the patient's history were reviewed and updated as appropriate: allergies, current medications, past family history, past medical history, past social history, past surgical history, and problem list.  Past Medical History:   Diagnosis Date    3-vessel CAD 09/06/2018    Severe on Cardiac Cath; Daily Aspirin--S/p CABG    Abnormal EKG 09/04/2018    Anxiety     Xanax    Breast pain, left 09/06/2018    Per Pt    Cataract     Chest pain 09/2018    Cholecystitis 07/2007    S/p Cholecystectomy    Cholecystolithiasis 07/2007    S/p Cholecystectomy    Colitis 2007    COPD (chronic obstructive pulmonary disease)     Diverticulosis 2007    DM (diabetes mellitus) Since 1948    T2--Metformin Currently    Dyslipidemia     Controlled w/Meds    Family history of heart disease     Mother & Father Both Had MI's    Gastritis 2007    History of bone density study 07/31/2008-[facility]    NL    Hx of chest x-ray 09/4/18-[facility]    WNL w/Calcified Atherosclerotic Disease in Thoracic Aorta    Hx of CT scan of chest 09/4/18-[facility]    Normal Findings with Degenerative Changes of Spine; No Evidence of Pulmonary Embolous Seen; 2.1CM Thyroid Nodule Noted    Hypertension Since 1948    Controlled w/Meds    Kidney disease, chronic, stage III (GFR 30-59 ml/min)     Nasal congestion 12/2016    Treated @ [facility]  "w/Antibiotics    Postmenopausal     Renal disease     Ringing of ears, bilateral     SOB (shortness of breath)     Chronic    Stress     Tobacco use     1 PPD     Past Surgical History:   Procedure Laterality Date    BREAST SURGERY Right     biopsy    CARDIAC CATHETERIZATION  18-[facility]    Dr. Hurtado--Severe L Main 3-V CAD    CARDIAC SURGERY      CHOLECYSTECTOMY  2007    Lap--Dusty Hernandez MD    COLONOSCOPY      COLONOSCOPY N/A 2019    Procedure: COLONOSCOPY with polypectomy X2;  Surgeon: Jazz More MD;  Location: [facility] ENDOSCOPY;  Service: Gastroenterology    CORONARY ARTERY BYPASS GRAFT  18-[facility]    Urgent x 4 with Left Saphenous Vein Grafting-Dr. Palmer    ENDOSCOPIC VEIN HARVEST  18-[facility]    Of R Lower Extremity-Dr. Palmer    ENDOSCOPY  07-[facility]    w/EGD-Erich Wasserman MD    HAND SURGERY      LAPAROSCOPIC TOTAL HYSTERECTOMY Bilateral     LAPAROSCOPIC TUBAL LIGATION       /62   Pulse 64   Ht 167.6 cm (65.98\")   Wt 61.2 kg (135 lb)   SpO2 97%   BMI 21.80 kg/m²   Family History   Problem Relation Age of Onset    Heart attack Mother     Coronary artery disease Mother         Had CABG    Heart attack Father          Age 63       Current Outpatient Medications:     ALPRAZolam (XANAX) 1 MG tablet, Take 1 tablet by mouth 4 (Four) Times a Day., Disp: , Rfl:     aspirin (SB Low Dose ASA EC) 81 MG EC tablet, Take 1 tablet by mouth Daily., Disp: , Rfl:     atorvastatin (LIPITOR) 40 MG tablet, Take 1 tablet by mouth Daily., Disp: , Rfl:     busPIRone (BUSPAR) 10 MG tablet, Take 1 tablet by mouth 2 (Two) Times a Day., Disp: , Rfl:     calcium carbonate (TUMS) 500 MG chewable tablet, Chew 1 tablet 3 (Three) Times a Day As Needed for Indigestion or Heartburn., Disp: , Rfl:     colesevelam (WELCHOL) 625 MG tablet, Take 1 tablet by mouth Every 12 (Twelve) Hours., Disp: , Rfl:     hydrALAZINE (APRESOLINE) 25 MG tablet, Take 1 tablet by mouth 2 (Two) Times a Day., Disp: , " Rfl:     ipratropium-albuterol (DUO-NEB) 0.5-2.5 mg/3 ml nebulizer, Take 3 mL by nebulization 4 (Four) Times a Day., Disp: , Rfl:     magnesium oxide (MAGOX) 400 (241.3 Mg) MG tablet tablet, Take 1 tablet by mouth Daily., Disp: , Rfl:     metFORMIN (GLUCOPHAGE) 500 MG tablet, Take 1 tablet by mouth 2 (Two) Times a Day With Meals., Disp: , Rfl:     nateglinide (STARLIX) 120 MG tablet, Take 1 tablet by mouth 2 (Two) Times a Day., Disp: , Rfl:     Omega-3 Fatty Acids (fish oil) 1000 MG capsule capsule, Take  by mouth 2 (Two) Times a Day With Meals., Disp: , Rfl:     sodium bicarbonate 650 MG tablet, Take 1 tablet by mouth Every 12 (Twelve) Hours., Disp: , Rfl:     Alpha-Lipoic Acid 300 MG tablet, Take  by mouth. (Patient not taking: Reported on 1/27/2025), Disp: , Rfl:     aspirin 325 MG EC tablet, Take 1 tablet by mouth Daily. (Patient not taking: Reported on 1/27/2025), Disp: , Rfl:     furosemide (LASIX) 20 MG tablet, Take 1 tablet by mouth 2 (Two) Times a Week. Monday and Friday (Patient not taking: Reported on 1/27/2025), Disp: , Rfl:     HYDROcodone-acetaminophen (NORCO) 5-325 MG per tablet, Take 1 tablet by mouth Every 6 (Six) Hours As Needed for Moderate Pain. (Patient not taking: Reported on 1/27/2025), Disp: 12 tablet, Rfl: 0    losartan (COZAAR) 50 MG tablet, Take 1 tablet by mouth Daily. (Patient not taking: Reported on 1/27/2025), Disp: , Rfl:     medroxyPROGESTERone (Provera) 10 MG tablet, Take 1 tablet by mouth 3 (Three) Times a Day As Needed (excessive vaginal bleeding). Take 1 tablet 3 times a day for 5 days, take 1 tablet twice a day for 5 days.  Then begin taking 3 times a day as needed for excessive vaginal bleeding. (Patient not taking: Reported on 1/27/2025), Disp: 30 tablet, Rfl: 0    vitamin D (ERGOCALCIFEROL) 1.25 MG (02705 UT) capsule capsule, Take 1 capsule by mouth 1 (One) Time Per Week. (Patient not taking: Reported on 1/27/2025), Disp: , Rfl:   Allergies   Allergen Reactions    Codeine  Unknown (See Comments)     Unknown     Social History     Socioeconomic History    Marital status:      Spouse name: Benji   Tobacco Use    Smoking status: Every Day     Current packs/day: 0.00     Types: Cigarettes     Last attempt to quit: 2018     Years since quittin.4     Passive exposure: Current    Smokeless tobacco: Never   Vaping Use    Vaping status: Never Used   Substance and Sexual Activity    Alcohol use: No    Drug use: No    Sexual activity: Defer     Birth control/protection: Post-menopausal, Surgical     Comment: TL     Review of Systems   Constitutional: Positive for malaise/fatigue.   Cardiovascular:  Negative for chest pain, dyspnea on exertion, leg swelling and palpitations.   Respiratory:  Negative for cough and shortness of breath.    Gastrointestinal:  Negative for abdominal pain, nausea and vomiting.   Neurological:  Negative for dizziness, focal weakness, headaches, light-headedness and numbness.   All other systems reviewed and are negative.             Objective:     Constitutional:       Appearance: Well-developed.   Eyes:      General: No scleral icterus.     Conjunctiva/sclera: Conjunctivae normal.   HENT:      Head: Normocephalic and atraumatic.   Neck:      Vascular: No carotid bruit or JVD.   Pulmonary:      Effort: Pulmonary effort is normal.      Breath sounds: Normal breath sounds. No wheezing. No rales.   Cardiovascular:      Normal rate. Regular rhythm.   Pulses:     Intact distal pulses.   Abdominal:      General: Bowel sounds are normal.      Palpations: Abdomen is soft.   Musculoskeletal:      Cervical back: Normal range of motion and neck supple. Skin:     General: Skin is warm and dry.      Findings: No rash.   Neurological:      Mental Status: Alert.       Procedures    Lab Review:         MDM    #1 coronary disease  Patient had coronary bypass surgery x 3 vessels with a LIMA to LAD and SVG to the marginal branch and RCA and is currently stable with no  angina  Patient will have an echocardiogram at her next visit  2.  Hyperlipidemia  Patient is currently on atorvastatin  3.  Hypertension  Patient's blood pressure currently stable on medications including hydralazine  4.  Diabetes  Patient is on metformin the sugar levels are followed by the primary care doctor    Patient's previous medical records, labs, and EKG were reviewed and discussed with the patient at today's visit.

## 2025-02-14 ENCOUNTER — OFFICE VISIT (OUTPATIENT)
Age: 75
End: 2025-02-14
Payer: MEDICARE

## 2025-02-14 VITALS
OXYGEN SATURATION: 98 % | SYSTOLIC BLOOD PRESSURE: 165 MMHG | WEIGHT: 134 LBS | HEIGHT: 66 IN | HEART RATE: 67 BPM | TEMPERATURE: 97.8 F | DIASTOLIC BLOOD PRESSURE: 77 MMHG | BODY MASS INDEX: 21.53 KG/M2

## 2025-02-14 DIAGNOSIS — L29.0 PRURITUS ANI: ICD-10-CM

## 2025-02-14 DIAGNOSIS — K64.5 THROMBOSED HEMORRHOIDS: ICD-10-CM

## 2025-02-14 DIAGNOSIS — Z12.11 ENCOUNTER FOR SCREENING COLONOSCOPY: Primary | ICD-10-CM

## 2025-02-14 RX ORDER — SODIUM, POTASSIUM,MAG SULFATES 17.5-3.13G
SOLUTION, RECONSTITUTED, ORAL ORAL
Qty: 177 ML | Refills: 0 | Status: SHIPPED | OUTPATIENT
Start: 2025-02-14

## 2025-02-14 NOTE — PROGRESS NOTES
Colorectal Surgery Consultation Note    ID:  Kathy Wu;   : 1950  DATE OF VISIT: 2025    Chief Complaint  New Patient (NP referred by Dr Ritchie for external hemorrhoids )       History of Present Illness       History of Present Illness  Kathy Wu is a 75 y.o. female who I was asked to see in consultation by Dr. Leonie landry. provider found for anorectal bleeding.    She has been experiencing an increase in the size of her hemorrhoids over the past 6 months, which initially were small. She reports no presence of blood in her stool, nausea, vomiting, or anal itching. However, she does experience discomfort due to the swelling of her hemorrhoids, which occasionally causes pain and difficulty in sitting. She was previously prescribed a medication by Dr. Adams, which she takes at noon and supper time when her symptoms are severe. She also reports no issues with stool leakage or incontinence.    Additionally, she reports a colon problem characterized by watery stools, which have become difficult to pass following the administration of a medication. Her appetite has decreased significantly. Her last colonoscopy was performed approximately 10 years ago.      Past Medical History  Past Medical History:   Diagnosis Date    3-vessel CAD 2018    Severe on Cardiac Cath; Daily Aspirin--S/p CABG    Abnormal EKG 2018    Anxiety     Xanax    Breast pain, left 2018    Per Pt    Cataract     Chest pain 2018    Cholecystitis 2007    S/p Cholecystectomy    Cholecystolithiasis 2007    S/p Cholecystectomy    Colitis     COPD (chronic obstructive pulmonary disease)     Diverticulosis     DM (diabetes mellitus) Since     T2--Metformin Currently    Dyslipidemia     Controlled w/Meds    Family history of heart disease     Mother & Father Both Had MI's    Gastritis     [facility] of [facility] density study 2008-[facility]    NL    Hx of chest x-ray 18-[facility]    WNL  w/Calcified Atherosclerotic Disease in Thoracic Aorta    Hx of CT scan of chest 18-Located within [facility]    Normal Findings with Degenerative Changes of Spine; No Evidence of Pulmonary Embolous Seen; 2.1CM Thyroid Nodule Noted    Hypertension Since 1948    Controlled w/Meds    Kidney disease, chronic, stage III (GFR 30-59 ml/min)     Nasal congestion 2016    Treated @ [facility] w/Antibiotics    Postmenopausal     Renal disease     Ringing of ears, bilateral     SOB (shortness of breath)     Chronic    Stress     Tobacco use     1 PPD     Past Surgical History  Past Surgical History:   Procedure Laterality Date    BREAST SURGERY Right     biopsy    CARDIAC CATHETERIZATION  18-Located [facility]    Dr. Hurtado--Severe L Main 3-V CAD    CARDIAC SURGERY      CHOLECYSTECTOMY  2007    Lap--Dusty Hernandez MD    COLONOSCOPY      COLONOSCOPY N/A 2019    Procedure: COLONOSCOPY with polypectomy X2;  Surgeon: Jazz More MD;  Location: [facility] ENDOSCOPY;  Service: Gastroenterology    CORONARY ARTERY BYPASS GRAFT  18-Located within [facility]    Urgent x 4 with Left Saphenous Vein Grafting-Dr. Palmer    ENDOSCOPIC VEIN HARVEST  18-Located within [facility]    Of R Lower Extremity-Dr. Palmer    ENDOSCOPY  07-[facility]    w/EGD-Erich Wasserman MD    HAND SURGERY      LAPAROSCOPIC TOTAL HYSTERECTOMY Bilateral     LAPAROSCOPIC TUBAL LIGATION       Family History  Family History   Problem Relation Age of Onset    Heart attack Mother     Coronary artery disease Mother         Had CABG    Heart attack Father          Age 63     No colorectal cancer history in immediate family members.  Social History  Social History     Tobacco Use    Smoking status: Every Day     Current packs/day: 0.00     Types: Cigarettes     Last attempt to quit: 2018     Years since quittin.4     Passive exposure: Current    Smokeless tobacco: Never   Vaping Use    Vaping status: Never Used   Substance Use Topics    Alcohol use: No    Drug use: No     For further details please see  Health History Questionnaire scanned in Epic.  Medication List  [unfilled]  Allergies  Allergies   Allergen Reactions    Codeine Unknown (See Comments)     Unknown     Review of Systems  All systems reviewed and are otherwise negative, pertinent positives noted in the HPI and Health History Questionnaire scanned in Epic.    Physical Exam  General:  No acute distress  Head: Normocephalic, atraumatic  Neuro: Alert and oriented    Abdomen:  Soft, non-tender, non-distended, no hernias, no hepatomegaly, no splenomegaly. No abnormal, audible bowel sounds.    External anorectal exam: moderate skin irritation, mo external tags   Digital rectal: no tenderness with exam, no palpable masses, tone is normal    Procedure Note  Procedure: anoscopy  Indication: rectal bleeding  Description: After digital examination was completed the scope was inserted into the anal canal. The rectum/colon was visualized to 8cm. See Findings below. The scope was then removed. Patient tolerated procedure well.  Findings: prominent, prolapsing internal hemorrhoids in the classic quadrants, otherwise no other mucosal lesions visualized.    Assessment & Plan  -Symptomatic internal hemorrhoids  -Anorectal pain   -Pruritis ani   -Irregular bowel habits     Plan / Recommendations    1. The patient's stool consistency is excessively wet, leading to external leakage and subsequent irritation. The hemorrhoids are not currently in a severe state. She will commence a regimen of Metamucil fiber, specifically 2 tablespoons mixed with 6 to 8 ounces of water, to be consumed daily in the morning. Adequate hydration throughout the day is essential to prevent constipation. A prescription for Calmoseptine cream has been provided, which can be applied as needed to alleviate skin irritation. She has been advised against excessive cleaning of the area and the use of wipes, recommending instead the use of water, a bidet, or a cotton ball for gentle rinsing and  arsen.    2. It would also be prudent to perform colonoscopy to rule out any more proximal causes of bleeding, which we will schedule at her convenience.     3. Follow up at the time of colonoscopy       Patient or patient representative verbalized consent for the use of Ambient Listening during the visit with  Walter Mcmanus MD for chart documentation. 2/16/2025  19:43 EST    Walter Mcmanus MD  Colon and Rectal Surgery   [employer]

## 2025-02-18 ENCOUNTER — PATIENT ROUNDING (BHMG ONLY) (OUTPATIENT)
Age: 75
End: 2025-02-18
Payer: MEDICARE

## 2025-02-18 NOTE — PROGRESS NOTES
February 18, 2025    [place], may I speak with Kathy Wu?      I am  with [facility]  [address] [place] [address] [phone].    Had to m     Thank you, and have a great day.

## 2025-05-01 ENCOUNTER — LAB (OUTPATIENT)
Dept: LAB | Facility: HOSPITAL | Age: 75
End: 2025-05-01
Payer: MEDICARE

## 2025-05-01 ENCOUNTER — TRANSCRIBE ORDERS (OUTPATIENT)
Dept: LAB | Facility: HOSPITAL | Age: 75
End: 2025-05-01
Payer: MEDICARE

## 2025-05-01 DIAGNOSIS — E11.22 TYPE 2 DIABETES MELLITUS WITH DIABETIC CHRONIC KIDNEY DISEASE, UNSPECIFIED CKD STAGE, UNSPECIFIED WHETHER LONG TERM INSULIN USE: ICD-10-CM

## 2025-05-01 DIAGNOSIS — M15.9 GENERALIZED OSTEOARTHROSIS, INVOLVING MULTIPLE SITES: ICD-10-CM

## 2025-05-01 DIAGNOSIS — E78.5 HYPERLIPIDEMIA, UNSPECIFIED HYPERLIPIDEMIA TYPE: ICD-10-CM

## 2025-05-01 DIAGNOSIS — E83.42 HYPOMAGNESEMIA: ICD-10-CM

## 2025-05-01 DIAGNOSIS — R80.9 PROTEINURIA, UNSPECIFIED TYPE: ICD-10-CM

## 2025-05-01 DIAGNOSIS — N18.32 CHRONIC KIDNEY DISEASE (CKD) STAGE G3B/A1, MODERATELY DECREASED GLOMERULAR FILTRATION RATE (GFR) BETWEEN 30-44 ML/MIN/1.73 SQUARE METER AND ALBUMINURIA CREATININE RATIO LESS THAN 30 MG/G (CMS/H*: Primary | ICD-10-CM

## 2025-05-01 DIAGNOSIS — N18.32 CHRONIC KIDNEY DISEASE (CKD) STAGE G3B/A1, MODERATELY DECREASED GLOMERULAR FILTRATION RATE (GFR) BETWEEN 30-44 ML/MIN/1.73 SQUARE METER AND ALBUMINURIA CREATININE RATIO LESS THAN 30 MG/G (CMS/H*: ICD-10-CM

## 2025-05-01 LAB
25(OH)D3 SERPL-MCNC: 45 NG/ML (ref 30–100)
ALBUMIN SERPL-MCNC: 4 G/DL (ref 3.5–5.2)
ALBUMIN/GLOB SERPL: 1.2 G/DL
ALP SERPL-CCNC: 91 U/L (ref 39–117)
ALT SERPL W P-5'-P-CCNC: 13 U/L (ref 1–33)
ANION GAP SERPL CALCULATED.3IONS-SCNC: 7.6 MMOL/L (ref 5–15)
AST SERPL-CCNC: 18 U/L (ref 1–32)
BACTERIA UR QL AUTO: ABNORMAL /HPF
BASOPHILS # BLD AUTO: 0.06 10*3/MM3 (ref 0–0.2)
BASOPHILS NFR BLD AUTO: 1 % (ref 0–1.5)
BILIRUB SERPL-MCNC: 0.2 MG/DL (ref 0–1.2)
BILIRUB UR QL STRIP: NEGATIVE
BILIRUB UR QL STRIP: NEGATIVE
BUN SERPL-MCNC: 23 MG/DL (ref 8–23)
BUN/CREAT SERPL: 15.3 (ref 7–25)
CALCIUM SPEC-SCNC: 9.4 MG/DL (ref 8.6–10.5)
CHLORIDE SERPL-SCNC: 100 MMOL/L (ref 98–107)
CK SERPL-CCNC: 63 U/L (ref 20–180)
CLARITY UR: CLEAR
CLARITY UR: CLEAR
CO2 SERPL-SCNC: 28.4 MMOL/L (ref 22–29)
COLOR UR: YELLOW
COLOR UR: YELLOW
CREAT SERPL-MCNC: 1.5 MG/DL (ref 0.57–1)
CREAT UR-MCNC: 13.8 MG/DL
DEPRECATED RDW RBC AUTO: 46.6 FL (ref 37–54)
EGFRCR SERPLBLD CKD-EPI 2021: 35.7 ML/MIN/1.73
EOSINOPHIL # BLD AUTO: 0.29 10*3/MM3 (ref 0–0.4)
EOSINOPHIL NFR BLD AUTO: 4.9 % (ref 0.3–6.2)
ERYTHROCYTE [DISTWIDTH] IN BLOOD BY AUTOMATED COUNT: 12.6 % (ref 12.3–15.4)
GLOBULIN UR ELPH-MCNC: 3.4 GM/DL
GLUCOSE SERPL-MCNC: 104 MG/DL (ref 65–99)
GLUCOSE UR STRIP-MCNC: NEGATIVE MG/DL
GLUCOSE UR STRIP-MCNC: NEGATIVE MG/DL
HBA1C MFR BLD: 5.45 % (ref 4.8–5.6)
HCT VFR BLD AUTO: 40.7 % (ref 34–46.6)
HGB BLD-MCNC: 12.6 G/DL (ref 12–15.9)
HGB UR QL STRIP.AUTO: NEGATIVE
HGB UR QL STRIP.AUTO: NEGATIVE
HOLD SPECIMEN: NORMAL
HYALINE CASTS UR QL AUTO: ABNORMAL /LPF
IMM GRANULOCYTES # BLD AUTO: 0.01 10*3/MM3 (ref 0–0.05)
IMM GRANULOCYTES NFR BLD AUTO: 0.2 % (ref 0–0.5)
KETONES UR QL STRIP: NEGATIVE
KETONES UR QL STRIP: NEGATIVE
LEUKOCYTE ESTERASE UR QL STRIP.AUTO: NEGATIVE
LEUKOCYTE ESTERASE UR QL STRIP.AUTO: NEGATIVE
LYMPHOCYTES # BLD AUTO: 2.95 10*3/MM3 (ref 0.7–3.1)
LYMPHOCYTES NFR BLD AUTO: 49.7 % (ref 19.6–45.3)
MAGNESIUM SERPL-MCNC: 2.2 MG/DL (ref 1.6–2.4)
MCH RBC QN AUTO: 30.7 PG (ref 26.6–33)
MCHC RBC AUTO-ENTMCNC: 31 G/DL (ref 31.5–35.7)
MCV RBC AUTO: 99.3 FL (ref 79–97)
MONOCYTES # BLD AUTO: 0.35 10*3/MM3 (ref 0.1–0.9)
MONOCYTES NFR BLD AUTO: 5.9 % (ref 5–12)
NEUTROPHILS NFR BLD AUTO: 2.27 10*3/MM3 (ref 1.7–7)
NEUTROPHILS NFR BLD AUTO: 38.3 % (ref 42.7–76)
NITRITE UR QL STRIP: NEGATIVE
NITRITE UR QL STRIP: NEGATIVE
NRBC BLD AUTO-RTO: 0 /100 WBC (ref 0–0.2)
PH UR STRIP.AUTO: 8 [PH] (ref 5–8)
PH UR STRIP.AUTO: 8 [PH] (ref 5–8)
PHOSPHATE SERPL-MCNC: 4 MG/DL (ref 2.5–4.5)
PLATELET # BLD AUTO: 229 10*3/MM3 (ref 140–450)
PMV BLD AUTO: 11.2 FL (ref 6–12)
POTASSIUM SERPL-SCNC: 5.5 MMOL/L (ref 3.5–5.2)
PROT ?TM UR-MCNC: 33.2 MG/DL
PROT SERPL-MCNC: 7.4 G/DL (ref 6–8.5)
PROT UR QL STRIP: ABNORMAL
PROT UR QL STRIP: ABNORMAL
PROT/CREAT UR: 2405.8 MG/G CREA (ref 0–200)
PTH-INTACT SERPL-MCNC: 39.1 PG/ML (ref 15–65)
RBC # BLD AUTO: 4.1 10*6/MM3 (ref 3.77–5.28)
RBC # UR STRIP: ABNORMAL /HPF
REF LAB TEST METHOD: ABNORMAL
SODIUM SERPL-SCNC: 136 MMOL/L (ref 136–145)
SP GR UR STRIP: 1.01 (ref 1–1.03)
SP GR UR STRIP: 1.01 (ref 1–1.03)
SQUAMOUS #/AREA URNS HPF: ABNORMAL /HPF
URATE SERPL-MCNC: 4.4 MG/DL (ref 2.4–5.7)
UROBILINOGEN UR QL STRIP: ABNORMAL
UROBILINOGEN UR QL STRIP: ABNORMAL
WBC # UR STRIP: ABNORMAL /HPF
WBC NRBC COR # BLD AUTO: 5.93 10*3/MM3 (ref 3.4–10.8)

## 2025-05-01 PROCEDURE — 82570 ASSAY OF URINE CREATININE: CPT

## 2025-05-01 PROCEDURE — 83970 ASSAY OF PARATHORMONE: CPT

## 2025-05-01 PROCEDURE — 84156 ASSAY OF PROTEIN URINE: CPT

## 2025-05-01 PROCEDURE — 85025 COMPLETE CBC W/AUTO DIFF WBC: CPT

## 2025-05-01 PROCEDURE — 81001 URINALYSIS AUTO W/SCOPE: CPT

## 2025-05-01 PROCEDURE — 83735 ASSAY OF MAGNESIUM: CPT

## 2025-05-01 PROCEDURE — 80053 COMPREHEN METABOLIC PANEL: CPT

## 2025-05-01 PROCEDURE — 36415 COLL VENOUS BLD VENIPUNCTURE: CPT

## 2025-05-01 PROCEDURE — 82550 ASSAY OF CK (CPK): CPT

## 2025-05-01 PROCEDURE — 84550 ASSAY OF BLOOD/URIC ACID: CPT

## 2025-05-01 PROCEDURE — 81003 URINALYSIS AUTO W/O SCOPE: CPT

## 2025-05-01 PROCEDURE — 82306 VITAMIN D 25 HYDROXY: CPT

## 2025-05-01 PROCEDURE — 84100 ASSAY OF PHOSPHORUS: CPT

## 2025-05-01 PROCEDURE — 83036 HEMOGLOBIN GLYCOSYLATED A1C: CPT

## 2025-05-22 ENCOUNTER — TRANSCRIBE ORDERS (OUTPATIENT)
Dept: ADMINISTRATIVE | Facility: HOSPITAL | Age: 75
End: 2025-05-22
Payer: MEDICARE

## 2025-05-22 ENCOUNTER — LAB (OUTPATIENT)
Dept: LAB | Facility: HOSPITAL | Age: 75
End: 2025-05-22
Payer: MEDICARE

## 2025-05-22 DIAGNOSIS — N18.32 CHRONIC KIDNEY DISEASE (CKD) STAGE G3B/A1, MODERATELY DECREASED GLOMERULAR FILTRATION RATE (GFR) BETWEEN 30-44 ML/MIN/1.73 SQUARE METER AND ALBUMINURIA CREATININE RATIO LESS THAN 30 MG/G (CMS/H*: Primary | ICD-10-CM

## 2025-05-22 DIAGNOSIS — N18.32 CHRONIC KIDNEY DISEASE (CKD) STAGE G3B/A1, MODERATELY DECREASED GLOMERULAR FILTRATION RATE (GFR) BETWEEN 30-44 ML/MIN/1.73 SQUARE METER AND ALBUMINURIA CREATININE RATIO LESS THAN 30 MG/G (CMS/H*: ICD-10-CM

## 2025-05-22 DIAGNOSIS — E55.9 VITAMIN D DEFICIENCY: ICD-10-CM

## 2025-05-22 DIAGNOSIS — E78.5 HYPERLIPIDEMIA, UNSPECIFIED HYPERLIPIDEMIA TYPE: ICD-10-CM

## 2025-05-22 DIAGNOSIS — M15.9 GENERALIZED OSTEOARTHROSIS, INVOLVING MULTIPLE SITES: ICD-10-CM

## 2025-05-22 DIAGNOSIS — R80.9 PROTEINURIA, UNSPECIFIED TYPE: ICD-10-CM

## 2025-05-22 DIAGNOSIS — E11.22 TYPE 2 DIABETES MELLITUS WITH CHRONIC KIDNEY DISEASE, WITHOUT LONG-TERM CURRENT USE OF INSULIN, UNSPECIFIED CKD STAGE: ICD-10-CM

## 2025-05-22 LAB
25(OH)D3 SERPL-MCNC: 40 NG/ML (ref 30–100)
ALBUMIN SERPL-MCNC: 3.6 G/DL (ref 3.5–5.2)
ALBUMIN/GLOB SERPL: 1.1 G/DL
ALP SERPL-CCNC: 85 U/L (ref 39–117)
ALT SERPL W P-5'-P-CCNC: 15 U/L (ref 1–33)
ANION GAP SERPL CALCULATED.3IONS-SCNC: 9.6 MMOL/L (ref 5–15)
AST SERPL-CCNC: 19 U/L (ref 1–32)
BACTERIA UR QL AUTO: ABNORMAL /HPF
BASOPHILS # BLD AUTO: 0.06 10*3/MM3 (ref 0–0.2)
BASOPHILS NFR BLD AUTO: 0.9 % (ref 0–1.5)
BILIRUB SERPL-MCNC: 0.2 MG/DL (ref 0–1.2)
BILIRUB UR QL STRIP: NEGATIVE
BUN SERPL-MCNC: 21 MG/DL (ref 8–23)
BUN/CREAT SERPL: 13.7 (ref 7–25)
CALCIUM SPEC-SCNC: 9.2 MG/DL (ref 8.6–10.5)
CHLORIDE SERPL-SCNC: 100 MMOL/L (ref 98–107)
CK SERPL-CCNC: 40 U/L (ref 20–180)
CLARITY UR: CLEAR
CO2 SERPL-SCNC: 26.4 MMOL/L (ref 22–29)
COLOR UR: YELLOW
CREAT SERPL-MCNC: 1.53 MG/DL (ref 0.57–1)
CREAT UR-MCNC: 33.2 MG/DL
DEPRECATED RDW RBC AUTO: 45.7 FL (ref 37–54)
EGFRCR SERPLBLD CKD-EPI 2021: 34.9 ML/MIN/1.73
EOSINOPHIL # BLD AUTO: 0.25 10*3/MM3 (ref 0–0.4)
EOSINOPHIL NFR BLD AUTO: 3.8 % (ref 0.3–6.2)
ERYTHROCYTE [DISTWIDTH] IN BLOOD BY AUTOMATED COUNT: 12.6 % (ref 12.3–15.4)
GLOBULIN UR ELPH-MCNC: 3.3 GM/DL
GLUCOSE SERPL-MCNC: 103 MG/DL (ref 65–99)
GLUCOSE UR STRIP-MCNC: NEGATIVE MG/DL
HBA1C MFR BLD: 5.34 % (ref 4.8–5.6)
HCT VFR BLD AUTO: 38.3 % (ref 34–46.6)
HGB BLD-MCNC: 11.9 G/DL (ref 12–15.9)
HGB UR QL STRIP.AUTO: NEGATIVE
HOLD SPECIMEN: NORMAL
HYALINE CASTS UR QL AUTO: ABNORMAL /LPF
IMM GRANULOCYTES # BLD AUTO: 0.01 10*3/MM3 (ref 0–0.05)
IMM GRANULOCYTES NFR BLD AUTO: 0.2 % (ref 0–0.5)
KETONES UR QL STRIP: NEGATIVE
LEUKOCYTE ESTERASE UR QL STRIP.AUTO: NEGATIVE
LYMPHOCYTES # BLD AUTO: 2.77 10*3/MM3 (ref 0.7–3.1)
LYMPHOCYTES NFR BLD AUTO: 42.3 % (ref 19.6–45.3)
MCH RBC QN AUTO: 30.7 PG (ref 26.6–33)
MCHC RBC AUTO-ENTMCNC: 31.1 G/DL (ref 31.5–35.7)
MCV RBC AUTO: 98.7 FL (ref 79–97)
MONOCYTES # BLD AUTO: 0.35 10*3/MM3 (ref 0.1–0.9)
MONOCYTES NFR BLD AUTO: 5.3 % (ref 5–12)
NEUTROPHILS NFR BLD AUTO: 3.11 10*3/MM3 (ref 1.7–7)
NEUTROPHILS NFR BLD AUTO: 47.5 % (ref 42.7–76)
NITRITE UR QL STRIP: NEGATIVE
NRBC BLD AUTO-RTO: 0 /100 WBC (ref 0–0.2)
PH UR STRIP.AUTO: 8 [PH] (ref 5–8)
PHOSPHATE SERPL-MCNC: 3.7 MG/DL (ref 2.5–4.5)
PLATELET # BLD AUTO: 253 10*3/MM3 (ref 140–450)
PMV BLD AUTO: 10.5 FL (ref 6–12)
POTASSIUM SERPL-SCNC: 4.9 MMOL/L (ref 3.5–5.2)
PROT ?TM UR-MCNC: 44.2 MG/DL
PROT SERPL-MCNC: 6.9 G/DL (ref 6–8.5)
PROT UR QL STRIP: ABNORMAL
PROT/CREAT UR: 1331.3 MG/G CREA (ref 0–200)
PTH-INTACT SERPL-MCNC: 49.9 PG/ML (ref 15–65)
RBC # BLD AUTO: 3.88 10*6/MM3 (ref 3.77–5.28)
RBC # UR STRIP: ABNORMAL /HPF
REF LAB TEST METHOD: ABNORMAL
SODIUM SERPL-SCNC: 136 MMOL/L (ref 136–145)
SP GR UR STRIP: 1.01 (ref 1–1.03)
SQUAMOUS #/AREA URNS HPF: ABNORMAL /HPF
URATE SERPL-MCNC: 4.9 MG/DL (ref 2.4–5.7)
UROBILINOGEN UR QL STRIP: ABNORMAL
WBC # UR STRIP: ABNORMAL /HPF
WBC NRBC COR # BLD AUTO: 6.55 10*3/MM3 (ref 3.4–10.8)

## 2025-05-22 PROCEDURE — 83036 HEMOGLOBIN GLYCOSYLATED A1C: CPT

## 2025-05-22 PROCEDURE — 83970 ASSAY OF PARATHORMONE: CPT

## 2025-05-22 PROCEDURE — 82550 ASSAY OF CK (CPK): CPT

## 2025-05-22 PROCEDURE — 81001 URINALYSIS AUTO W/SCOPE: CPT

## 2025-05-22 PROCEDURE — 84100 ASSAY OF PHOSPHORUS: CPT

## 2025-05-22 PROCEDURE — 36415 COLL VENOUS BLD VENIPUNCTURE: CPT

## 2025-05-22 PROCEDURE — 84550 ASSAY OF BLOOD/URIC ACID: CPT

## 2025-05-22 PROCEDURE — 84156 ASSAY OF PROTEIN URINE: CPT

## 2025-05-22 PROCEDURE — 82570 ASSAY OF URINE CREATININE: CPT

## 2025-05-22 PROCEDURE — 82306 VITAMIN D 25 HYDROXY: CPT

## 2025-05-22 PROCEDURE — 80053 COMPREHEN METABOLIC PANEL: CPT

## 2025-05-22 PROCEDURE — 85025 COMPLETE CBC W/AUTO DIFF WBC: CPT

## 2025-06-02 ENCOUNTER — ANESTHESIA EVENT (OUTPATIENT)
Dept: GASTROENTEROLOGY | Facility: HOSPITAL | Age: 75
End: 2025-06-02
Payer: MEDICARE

## 2025-06-02 ENCOUNTER — ANESTHESIA (OUTPATIENT)
Dept: GASTROENTEROLOGY | Facility: HOSPITAL | Age: 75
End: 2025-06-02
Payer: MEDICARE

## 2025-06-02 ENCOUNTER — HOSPITAL ENCOUNTER (OUTPATIENT)
Facility: HOSPITAL | Age: 75
Setting detail: HOSPITAL OUTPATIENT SURGERY
Discharge: HOME | End: 2025-06-02
Attending: STUDENT IN AN ORGANIZED HEALTH CARE EDUCATION/TRAINING PROGRAM | Admitting: STUDENT IN AN ORGANIZED HEALTH CARE EDUCATION/TRAINING PROGRAM
Payer: MEDICARE

## 2025-06-02 VITALS
SYSTOLIC BLOOD PRESSURE: 163 MMHG | RESPIRATION RATE: 22 BRPM | HEIGHT: 66 IN | OXYGEN SATURATION: 100 % | HEART RATE: 61 BPM | WEIGHT: 127.87 LBS | TEMPERATURE: 98.8 F | DIASTOLIC BLOOD PRESSURE: 56 MMHG | BODY MASS INDEX: 20.55 KG/M2

## 2025-06-02 DIAGNOSIS — Z12.11 ENCOUNTER FOR SCREENING COLONOSCOPY: ICD-10-CM

## 2025-06-02 LAB — GLUCOSE BLDC GLUCOMTR-MCNC: 108 MG/DL (ref 70–105)

## 2025-06-02 PROCEDURE — 25010000002 PROPOFOL 1000 MG/100ML EMULSION: Performed by: NURSE ANESTHETIST, CERTIFIED REGISTERED

## 2025-06-02 PROCEDURE — 82948 REAGENT STRIP/BLOOD GLUCOSE: CPT

## 2025-06-02 PROCEDURE — 88305 TISSUE EXAM BY PATHOLOGIST: CPT | Performed by: STUDENT IN AN ORGANIZED HEALTH CARE EDUCATION/TRAINING PROGRAM

## 2025-06-02 PROCEDURE — 25810000003 SODIUM CHLORIDE 0.9 % SOLUTION: Performed by: NURSE ANESTHETIST, CERTIFIED REGISTERED

## 2025-06-02 PROCEDURE — 25010000002 LIDOCAINE PF 2% 2 % SOLUTION: Performed by: NURSE ANESTHETIST, CERTIFIED REGISTERED

## 2025-06-02 RX ORDER — SODIUM CHLORIDE 9 MG/ML
INJECTION, SOLUTION INTRAVENOUS CONTINUOUS PRN
Status: DISCONTINUED | OUTPATIENT
Start: 2025-06-02 | End: 2025-06-02 | Stop reason: SURG

## 2025-06-02 RX ORDER — PROPOFOL 10 MG/ML
INJECTION, EMULSION INTRAVENOUS AS NEEDED
Status: DISCONTINUED | OUTPATIENT
Start: 2025-06-02 | End: 2025-06-02 | Stop reason: SURG

## 2025-06-02 RX ORDER — LIDOCAINE HYDROCHLORIDE 20 MG/ML
INJECTION, SOLUTION EPIDURAL; INFILTRATION; INTRACAUDAL; PERINEURAL AS NEEDED
Status: DISCONTINUED | OUTPATIENT
Start: 2025-06-02 | End: 2025-06-02 | Stop reason: SURG

## 2025-06-02 RX ORDER — SODIUM CHLORIDE 9 MG/ML
30 INJECTION, SOLUTION INTRAVENOUS ONCE
Status: DISCONTINUED | OUTPATIENT
Start: 2025-06-02 | End: 2025-06-02 | Stop reason: HOSPADM

## 2025-06-02 RX ADMIN — PROPOFOL 50 MG: 10 INJECTION, EMULSION INTRAVENOUS at 08:25

## 2025-06-02 RX ADMIN — PROPOFOL 50 MG: 10 INJECTION, EMULSION INTRAVENOUS at 08:29

## 2025-06-02 RX ADMIN — PROPOFOL 100 MG: 10 INJECTION, EMULSION INTRAVENOUS at 08:11

## 2025-06-02 RX ADMIN — PROPOFOL 50 MG: 10 INJECTION, EMULSION INTRAVENOUS at 08:20

## 2025-06-02 RX ADMIN — LIDOCAINE HYDROCHLORIDE 50 MG: 20 INJECTION, SOLUTION EPIDURAL; INFILTRATION; INTRACAUDAL; PERINEURAL at 08:11

## 2025-06-02 RX ADMIN — SODIUM CHLORIDE: 9 INJECTION, SOLUTION INTRAVENOUS at 08:06

## 2025-06-02 RX ADMIN — PROPOFOL 50 MG: 10 INJECTION, EMULSION INTRAVENOUS at 08:37

## 2025-06-02 RX ADMIN — PROPOFOL 35 MG: 10 INJECTION, EMULSION INTRAVENOUS at 08:14

## 2025-06-02 NOTE — DISCHARGE INSTRUCTIONS
A responsible adult should stay with you and you should rest quietly for the rest of the day.    Do not drink alcohol, drive, operate any heavy machinery or power tools or make any legal/important decisions for the next 24 hours.     If you begin to experience severe pain, increased shortness of breath, racing heartbeat or a fever above 101 F, seek immediate medical attention.     Follow up with MD as instructed. Call office for results in 3 to 5 days if needed.     Office number: [phone]    Recommendation:      No further colonoscopy warranted unless further diagnostic or therapeutic  Follow-up in clinic for rubber band ligation of internal hemorrhoids

## 2025-06-02 NOTE — H&P
Colorectal Surgery Preop Note    ID:  Kathy Wu;     Chief Complaint  Screening colonoscopy     History of Present Illness  Kathy Wu is a 75 y.o. female who is here for Screening colonoscopy      Past Medical History  Past Medical History:   Diagnosis Date    3-vessel CAD 09/06/2018    Severe on Cardiac Cath; Daily Aspirin--S/p CABG    Abnormal EKG 09/04/2018    Anxiety     Xanax    Breast pain, left 09/06/2018    Per Pt    Cataract     Chest pain 09/2018    Cholecystitis 07/2007    S/p Cholecystectomy    Cholecystolithiasis 07/2007    S/p Cholecystectomy    Colitis 2007    COPD (chronic obstructive pulmonary disease)     Diverticulosis 2007    DM (diabetes mellitus) Since 1950    T2--Metformin Currently    Dyslipidemia     Controlled w/Meds    Family history of heart disease     Mother & Father Both Had MI's    Gastritis 2007    History of bone density study 07/31/2008-[facility]    NL    Hx of chest x-ray 09/4/18-[facility]    WNL w/Calcified Atherosclerotic Disease in Thoracic Aorta    Hx of CT scan of chest 09/4/18-[facility]    Normal Findings with Degenerative Changes of Spine; No Evidence of Pulmonary Embolous Seen; 2.1CM Thyroid Nodule Noted    Hypertension Since 1948    Controlled w/Meds    Kidney disease, chronic, stage III (GFR 30-59 ml/min)     Nasal congestion 12/2016    Treated @ [facility] w/Antibiotics    Postmenopausal     Renal disease     Ringing of ears, bilateral     SOB (shortness of breath)     Chronic    Stress     Tobacco use     1 PPD     For further details please see prior notes and Health History Questionnaire scanned in Epic.  Past Surgical History  Past Surgical History:   Procedure Laterality Date    BREAST SURGERY Right     biopsy    CARDIAC CATHETERIZATION  09/6/18-[facility]    Dr. Hurtado--Severe L Main 3-V CAD    CARDIAC SURGERY      CHOLECYSTECTOMY  07/23/2007    Lap--Dusty Hernandez MD    COLONOSCOPY      COLONOSCOPY N/A 07/08/2019    Procedure: COLONOSCOPY with polypectomy X2;  Surgeon:  Jazz More MD;  Location: [facility] ENDOSCOPY;  Service: Gastroenterology    CORONARY ARTERY BYPASS GRAFT  18-[facility]    Urgent x 4 with Left Saphenous Vein Grafting-Dr. Palmer    ENDOSCOPIC VEIN HARVEST  18-[facility]    Of R Lower Extremity-Dr. Palmer    ENDOSCOPY  07-[facility]    w/EGD-Erich Wasserman MD    HAND SURGERY      LAPAROSCOPIC TOTAL HYSTERECTOMY Bilateral     LAPAROSCOPIC TUBAL LIGATION       For further details please see prior notes and Health History Questionnaire scanned in Epic.  Family History  Family History   Problem Relation Age of Onset    Heart attack Mother     Coronary artery disease Mother         Had CABG    Heart attack Father          Age 63     For further details please see prior notes and Health History Questionnaire scanned in Epic.  Social History  Social History     Tobacco Use    Smoking status: Every Day     Current packs/day: 0.00     Types: Cigarettes     Last attempt to quit: 2018     Years since quittin.7     Passive exposure: Current    Smokeless tobacco: Never   Vaping Use    Vaping status: Never Used   Substance Use Topics    Alcohol use: No    Drug use: No     For further details please see prior notes and Health History Questionnaire scanned in Epic.  Medication List    Current Facility-Administered Medications:     sodium chloride 0.9 % infusion, 30 mL/hr, Intravenous, Once, Floyd Weston MD  For further details please see prior notes and Health History Questionnaire scanned in Epic.  Allergies  Allergies   Allergen Reactions    Codeine Unknown (See Comments)     Unknown     Review of Systems  Pertinent positives noted in HPI.    Physical Exam  General:  No acute distress  Head: Normocephalic, atraumatic  CV: Regular rate, no edema, extremities warm and well perfused  Pulm: Symmetric chest rise, non-labored breathing  Neuro: Alert and oriented     Abdomen: Please see clinic note  Anorectal: Please see clinic  note    Assessment  -Screening colonoscopy       Plan / Recommendations    1. Proceed to endo for screening colonoscopy       Walter Mcmanus MD  Colon and Rectal Surgery   [facility]

## 2025-06-02 NOTE — ANESTHESIA POSTPROCEDURE EVALUATION
Patient: Kathy Wu    Procedure Summary       Date: 06/02/25 Room / Location: [facility] ENDOSCOPY 4 / [facility] ENDOSCOPY    Anesthesia Start: 0806 Anesthesia Stop: 0902    Procedure: COLONOSCOPY WITH COLD FORCEP BIOPSY X2 AREAS, COLD SNARE POLYPECTOMY (Rectum) Diagnosis:       Encounter for screening colonoscopy      (Encounter for screening colonoscopy [Z12.11])    Surgeons: Walter Mcmanus MD Provider: Floyd Weston MD    Anesthesia Type: MAC, general ASA Status: 3            Anesthesia Type: MAC, general    Vitals  Vitals Value Taken Time   /56 06/02/25 09:29   Temp     Pulse 63 06/02/25 09:40   Resp     SpO2 99 % 06/02/25 09:40   Vitals shown include unfiled device data.        Post Anesthesia Care and Evaluation    Patient location during evaluation: PACU  Patient participation: complete - patient participated  Level of consciousness: awake  Pain scale: See nurse's notes for pain score.  Pain management: adequate    Airway patency: patent  Anesthetic complications: No anesthetic complications  PONV Status: none  Cardiovascular status: acceptable  Respiratory status: acceptable and spontaneous ventilation  Hydration status: acceptable    Comments: Patient seen and examined postoperatively; vital signs stable; SpO2 greater than or equal to 90%; cardiopulmonary status stable; nausea/vomiting adequately controlled; pain adequately controlled; no apparent anesthesia complications; patient discharged from anesthesia care when discharge criteria were met

## 2025-06-02 NOTE — OP NOTE
[facility] Colorectal Surgery  Colonoscopy Examination     Name: Kathy Wu  : 1950  Date of Colonoscopy: 2025  Procedure: Diagnostic Colonoscopy with chronic diarrhea and bleeding   Surgeon: Walter Mcmanus MD   Anesthesia: Sedation   IV Fluids: refer to anesthesia record    Procedure Details:    Prior to the procedure, history and physical exam was performed. Patient's medication and allergies were reviewed. The risk and benefits of the procedure and sedation options and risks were discussed with the patient. All questions were answered and informed consent was obtained.    The patient was brought to the endoscopy suite and placed in the left lateral decubitus position. Conscious sedation was administered by the anesthesia team. Vital signs including blood pressure, heart rate, and oxygen saturation were monitored continuously throughout the procedure.    The colonoscope was introduced through the rectum and advanced through the entire colon under direct visualization. The scope was maneuvered carefully, and the mucosa of the rectum, sigmoid colon, descending colon, transverse colon, ascending colon, and cecum were thoroughly inspected. Adequate insufflation was maintained throughout the procedure for optimal visualization.    Findings:    Tortuous and difficult colonoscopy with extensive diverticulosis and mucosal thickening throughout the colon.  Random Biopsies were taken in the TI and throughout the colon    Rectum: Extensive and enlarged and prolapsing internal hemorrhoids.  10 mm sessile polyp was removed with a cold snare  Sigmoid colon: Extensive diverticulosis with mucosal thickening with pale appearance and hypervascular.  The sigmoid colon was very rigid and noncompliant.  There is partial stricturing that was able to traverse through pediatric scope.                                               Descending colon: Extensive diverticulosis, no mucosal lesions or polyps.  Transverse colon: Scattered  diverticulosis with no lesions,polyps, or abnormalities.  Ascending colon: Scattered diverticulosis with no lesions,polyps, or abnormalities.  Cecum: Normal appearance with no lesions,polyps, or abnormalities.  TI: No ulceration or inflammation normal terminal ileum    Procedure Images:            Interventions:  Random biopsies in the terminal ileum and throughout the colon with cold biopsy forceps  Rectum (10 mm, sessile) Cold snare polypectomy    Specimens:  The removed polyp was retrieved and sent for histopathological examination to the pathology department for further analysis.    Recommendation:     No further colonoscopy warranted unless further diagnostic or therapeutic  Follow-up in clinic for rubber band ligation of internal hemorrhoids    Conclusion:  The diagnostic colonoscopy was completed successfully, and the entire colon was visualized without any complications. The patient tolerated the procedure well and was transferred to the recovery area in stable condition. Post-procedure instructions and follow-up were discussed with the patient and will be provided in written form.    Walter Mcmanus MD  Colon and Rectal Surgery   [facility]   [address] [place], [place], [address]  T: [phone]

## 2025-06-02 NOTE — ANESTHESIA PREPROCEDURE EVALUATION
Anesthesia Evaluation     Patient summary reviewed and Nursing notes reviewed   NPO Solid Status: > 8 hours  NPO Liquid Status: > 8 hours           Airway   Mallampati: II  TM distance: >3 FB  Neck ROM: full  No difficulty expected  Dental - normal exam     Pulmonary - normal exam   (+) COPD,shortness of breath  Cardiovascular - normal exam    ECG reviewed    (+) hypertension, CAD, CABG      Neuro/Psych  GI/Hepatic/Renal/Endo    (+) renal disease-, diabetes mellitus    Musculoskeletal     Abdominal  - normal exam    Bowel sounds: normal.   Substance History      OB/GYN          Other        ROS/Med Hx Other: 2018 CABG, EF60%, normal valves              Anesthesia Plan    ASA 3     MAC and general     intravenous induction     Anesthetic plan, risks, benefits, and alternatives have been provided, discussed and informed consent has been obtained with: patient.    Plan discussed with CRNA.    CODE STATUS:

## 2025-06-04 LAB
LAB AP CASE REPORT: NORMAL
PATH REPORT.FINAL DX SPEC: NORMAL
PATH REPORT.GROSS SPEC: NORMAL

## 2025-08-11 ENCOUNTER — OFFICE VISIT (OUTPATIENT)
Dept: CARDIOLOGY | Facility: CLINIC | Age: 75
End: 2025-08-11
Payer: MEDICARE

## 2025-08-11 VITALS
DIASTOLIC BLOOD PRESSURE: 60 MMHG | SYSTOLIC BLOOD PRESSURE: 134 MMHG | BODY MASS INDEX: 22.16 KG/M2 | HEART RATE: 60 BPM | WEIGHT: 133 LBS | OXYGEN SATURATION: 99 % | HEIGHT: 65 IN

## 2025-08-11 DIAGNOSIS — E11.9 TYPE 2 DIABETES MELLITUS WITHOUT COMPLICATION, WITHOUT LONG-TERM CURRENT USE OF INSULIN: ICD-10-CM

## 2025-08-11 DIAGNOSIS — I20.9 ANGINA PECTORIS: ICD-10-CM

## 2025-08-11 DIAGNOSIS — I10 PRIMARY HYPERTENSION: ICD-10-CM

## 2025-08-11 DIAGNOSIS — E78.00 PURE HYPERCHOLESTEROLEMIA: ICD-10-CM

## 2025-08-11 DIAGNOSIS — I25.10 CORONARY ARTERY DISEASE INVOLVING NATIVE CORONARY ARTERY OF NATIVE HEART WITHOUT ANGINA PECTORIS: Primary | ICD-10-CM

## 2025-08-11 DIAGNOSIS — J44.9 CHRONIC OBSTRUCTIVE PULMONARY DISEASE, UNSPECIFIED COPD TYPE: ICD-10-CM

## 2025-08-11 DIAGNOSIS — R01.1 HEART MURMUR: ICD-10-CM

## 2025-08-11 PROCEDURE — 1160F RVW MEDS BY RX/DR IN RCRD: CPT | Performed by: INTERNAL MEDICINE

## 2025-08-11 PROCEDURE — 3075F SYST BP GE 130 - 139MM HG: CPT | Performed by: INTERNAL MEDICINE

## 2025-08-11 PROCEDURE — 1159F MED LIST DOCD IN RCRD: CPT | Performed by: INTERNAL MEDICINE

## 2025-08-11 PROCEDURE — 3078F DIAST BP <80 MM HG: CPT | Performed by: INTERNAL MEDICINE

## 2025-08-11 PROCEDURE — 99214 OFFICE O/P EST MOD 30 MIN: CPT | Performed by: INTERNAL MEDICINE

## 2025-08-11 RX ORDER — NITROGLYCERIN 0.4 MG/1
TABLET SUBLINGUAL
Qty: 25 TABLET | Refills: 1 | Status: SHIPPED | OUTPATIENT
Start: 2025-08-11

## 2025-08-26 ENCOUNTER — HOSPITAL ENCOUNTER (OUTPATIENT)
Dept: CARDIOLOGY | Facility: HOSPITAL | Age: 75
Discharge: HOME | End: 2025-08-26
Payer: MEDICARE

## 2025-08-26 DIAGNOSIS — I25.10 CORONARY ARTERY DISEASE INVOLVING NATIVE CORONARY ARTERY OF NATIVE HEART WITHOUT ANGINA PECTORIS: ICD-10-CM

## 2025-08-26 DIAGNOSIS — E11.9 TYPE 2 DIABETES MELLITUS WITHOUT COMPLICATION, WITHOUT LONG-TERM CURRENT USE OF INSULIN: ICD-10-CM

## 2025-08-26 DIAGNOSIS — I20.89 ANGINA AT REST: Primary | ICD-10-CM

## 2025-08-26 DIAGNOSIS — R01.1 HEART MURMUR: ICD-10-CM

## 2025-08-26 DIAGNOSIS — J44.9 CHRONIC OBSTRUCTIVE PULMONARY DISEASE, UNSPECIFIED COPD TYPE: ICD-10-CM

## 2025-08-26 DIAGNOSIS — I20.9 ANGINA PECTORIS: ICD-10-CM

## 2025-08-26 DIAGNOSIS — E78.00 PURE HYPERCHOLESTEROLEMIA: ICD-10-CM

## 2025-08-26 DIAGNOSIS — R94.39 ABNORMAL NUCLEAR STRESS TEST: ICD-10-CM

## 2025-08-26 DIAGNOSIS — I10 PRIMARY HYPERTENSION: ICD-10-CM

## 2025-08-26 LAB
AORTIC DIMENSIONLESS INDEX: 0.64 (DI)
AV MEAN PRESS GRAD SYS DOP V1V2: 6.2 MMHG
AV VMAX SYS DOP: 169.4 CM/SEC
BH CV ECHO MEAS - AO MAX PG: 11.5 MMHG
BH CV ECHO MEAS - AO ROOT DIAM: 3 CM
BH CV ECHO MEAS - AO V2 VTI: 45.5 CM
BH CV ECHO MEAS - AVA(I,D): 1.83 CM2
BH CV ECHO MEAS - EDV(CUBED): 78.9 ML
BH CV ECHO MEAS - EDV(MOD-SP2): 78.7 ML
BH CV ECHO MEAS - EDV(MOD-SP4): 95.3 ML
BH CV ECHO MEAS - EF(MOD-SP2): 62.4 %
BH CV ECHO MEAS - EF(MOD-SP4): 63.5 %
BH CV ECHO MEAS - ESV(CUBED): 21 ML
BH CV ECHO MEAS - ESV(MOD-SP2): 29.6 ML
BH CV ECHO MEAS - ESV(MOD-SP4): 34.8 ML
BH CV ECHO MEAS - FS: 35.7 %
BH CV ECHO MEAS - IVS/LVPW: 0.9 CM
BH CV ECHO MEAS - IVSD: 1.32 CM
BH CV ECHO MEAS - LA DIMENSION: 4.3 CM
BH CV ECHO MEAS - LAT PEAK E' VEL: 6.2 CM/SEC
BH CV ECHO MEAS - LV DIASTOLIC VOL/BSA (35-75): 57.3 CM2
BH CV ECHO MEAS - LV MASS(C)D: 229.4 GRAMS
BH CV ECHO MEAS - LV MAX PG: 4.2 MMHG
BH CV ECHO MEAS - LV MEAN PG: 2.6 MMHG
BH CV ECHO MEAS - LV SYSTOLIC VOL/BSA (12-30): 20.9 CM2
BH CV ECHO MEAS - LV V1 MAX: 102.9 CM/SEC
BH CV ECHO MEAS - LV V1 VTI: 29.1 CM
BH CV ECHO MEAS - LVIDD: 4.3 CM
BH CV ECHO MEAS - LVIDS: 2.8 CM
BH CV ECHO MEAS - LVOT AREA: 2.9 CM2
BH CV ECHO MEAS - LVOT DIAM: 1.91 CM
BH CV ECHO MEAS - LVPWD: 1.46 CM
BH CV ECHO MEAS - MED PEAK E' VEL: 4 CM/SEC
BH CV ECHO MEAS - MR MAX PG: 195.1 MMHG
BH CV ECHO MEAS - MR MAX VEL: 698.4 CM/SEC
BH CV ECHO MEAS - MR MEAN PG: 136 MMHG
BH CV ECHO MEAS - MR MEAN VEL: 547.5 CM/SEC
BH CV ECHO MEAS - MR VTI: 254.7 CM
BH CV ECHO MEAS - MV A MAX VEL: 51.9 CM/SEC
BH CV ECHO MEAS - MV DEC SLOPE: 2131 CM/SEC2
BH CV ECHO MEAS - MV DEC TIME: 0.09 SEC
BH CV ECHO MEAS - MV E MAX VEL: 181.8 CM/SEC
BH CV ECHO MEAS - MV E/A: 3.5
BH CV ECHO MEAS - MV MAX PG: 19.2 MMHG
BH CV ECHO MEAS - MV MEAN PG: 5 MMHG
BH CV ECHO MEAS - MV V2 VTI: 51.2 CM
BH CV ECHO MEAS - MVA(VTI): 1.62 CM2
BH CV ECHO MEAS - PA ACC TIME: 0.09 SEC
BH CV ECHO MEAS - PA V2 MAX: 92.6 CM/SEC
BH CV ECHO MEAS - PI END-D VEL: 129.2 CM/SEC
BH CV ECHO MEAS - RAP SYSTOLE: 15 MMHG
BH CV ECHO MEAS - RV MAX PG: 1.31 MMHG
BH CV ECHO MEAS - RV V1 MAX: 57.1 CM/SEC
BH CV ECHO MEAS - RV V1 VTI: 16.2 CM
BH CV ECHO MEAS - RVDD: 2.8 CM
BH CV ECHO MEAS - RVSP: 66.3 MMHG
BH CV ECHO MEAS - SV(LVOT): 83.1 ML
BH CV ECHO MEAS - SV(MOD-SP2): 49.1 ML
BH CV ECHO MEAS - SV(MOD-SP4): 60.5 ML
BH CV ECHO MEAS - SVI(LVOT): 49.9 ML/M2
BH CV ECHO MEAS - SVI(MOD-SP2): 29.5 ML/M2
BH CV ECHO MEAS - SVI(MOD-SP4): 36.4 ML/M2
BH CV ECHO MEAS - TAPSE (>1.6): 1.56 CM
BH CV ECHO MEAS - TR MAX PG: 51.3 MMHG
BH CV ECHO MEAS - TR MAX VEL: 358 CM/SEC
BH CV ECHO MEASUREMENTS AVERAGE E/E' RATIO: 35.65
BH CV ECHO SHUNT ASSESSMENT PERFORMED (HIDDEN SCRIPTING): 1
BH CV REST NUCLEAR ISOTOPE DOSE: 10.1 MCI
BH CV STRESS BP STAGE 1: NORMAL
BH CV STRESS COMMENTS STAGE 1: NORMAL
BH CV STRESS DOSE REGADENOSON STAGE 1: 0.4
BH CV STRESS DURATION MIN STAGE 1: 0
BH CV STRESS DURATION SEC STAGE 1: 10
BH CV STRESS HR STAGE 1: 64
BH CV STRESS NUCLEAR ISOTOPE DOSE: 32.1 MCI
BH CV STRESS PROTOCOL 1: NORMAL
BH CV STRESS RECOVERY BP: NORMAL MMHG
BH CV STRESS RECOVERY HR: 90 BPM
BH CV STRESS STAGE 1: 1
MAXIMAL PREDICTED HEART RATE: 143 BPM
SPECT HRT GATED+EF W RNC IV: 31 %
STRESS BASELINE BP: NORMAL MMHG
STRESS BASELINE HR: 64 BPM
STRESS TARGET HR: 122 BPM

## 2025-08-26 PROCEDURE — 25010000002 REGADENOSON 0.4 MG/5ML SOLUTION: Performed by: INTERNAL MEDICINE

## 2025-08-26 PROCEDURE — 34310000005 TECHNETIUM SESTAMIBI: Performed by: INTERNAL MEDICINE

## 2025-08-26 PROCEDURE — 93356 MYOCRD STRAIN IMG SPCKL TRCK: CPT

## 2025-08-26 PROCEDURE — 93017 CV STRESS TEST TRACING ONLY: CPT

## 2025-08-26 PROCEDURE — 78452 HT MUSCLE IMAGE SPECT MULT: CPT

## 2025-08-26 PROCEDURE — A9500 TC99M SESTAMIBI: HCPCS | Performed by: INTERNAL MEDICINE

## 2025-08-26 PROCEDURE — 93306 TTE W/DOPPLER COMPLETE: CPT

## 2025-08-26 RX ORDER — REGADENOSON 0.08 MG/ML
0.4 INJECTION, SOLUTION INTRAVENOUS
Status: COMPLETED | OUTPATIENT
Start: 2025-08-26 | End: 2025-08-26

## 2025-08-26 RX ADMIN — REGADENOSON 0.4 MG: 0.08 INJECTION, SOLUTION INTRAVENOUS at 10:27

## 2025-08-26 RX ADMIN — TECHNETIUM TC 99M SESTAMIBI 1 DOSE: 1 INJECTION INTRAVENOUS at 10:27

## 2025-08-26 RX ADMIN — TECHNETIUM TC 99M SESTAMIBI 1 DOSE: 1 INJECTION INTRAVENOUS at 08:38

## 2025-08-28 ENCOUNTER — TELEPHONE (OUTPATIENT)
Dept: CARDIOLOGY | Facility: CLINIC | Age: 75
End: 2025-08-28
Payer: MEDICARE

## (undated) DEVICE — PK ENDO GI 50

## (undated) DEVICE — SNAR POLYP HOTSNARE/BRAIDED OVL/MINI 7F 2.8X10MM 230CM 1P/U

## (undated) DEVICE — PAPR PRNT PK SONY W RIBN UPC55

## (undated) DEVICE — SNAR POLYP CAPTIVATOR HEX 27MM 240CM

## (undated) DEVICE — TRAP WIDEEYE POLYP

## (undated) DEVICE — SINGLE-USE BIOPSY FORCEPS: Brand: RADIAL JAW 4

## (undated) DEVICE — 3M™ PATIENT PLATE, CORDED, SPLIT, LARGE, 40 PER CASE, 1179: Brand: 3M™